# Patient Record
Sex: MALE | Race: WHITE | NOT HISPANIC OR LATINO | URBAN - METROPOLITAN AREA
[De-identification: names, ages, dates, MRNs, and addresses within clinical notes are randomized per-mention and may not be internally consistent; named-entity substitution may affect disease eponyms.]

---

## 2018-02-28 ENCOUNTER — OFFICE VISIT (OUTPATIENT)
Dept: DERMATOLOGY | Facility: IMAGING CENTER | Age: 23
End: 2018-02-28
Payer: COMMERCIAL

## 2018-02-28 VITALS — WEIGHT: 190 LBS | TEMPERATURE: 98.6 F | HEIGHT: 70 IN | BODY MASS INDEX: 27.2 KG/M2

## 2018-02-28 DIAGNOSIS — L70.0 ACNE VULGARIS: ICD-10-CM

## 2018-02-28 PROCEDURE — 99203 OFFICE O/P NEW LOW 30 MIN: CPT | Performed by: DERMATOLOGY

## 2018-02-28 ASSESSMENT — ENCOUNTER SYMPTOMS
CHILLS: 0
FEVER: 0

## 2018-02-28 NOTE — PROGRESS NOTES
Dermatology New Patient Visit    Chief Complaint   Patient presents with   • Acne       Subjective:     HPI:   Ricardo Bess is a 22 y.o. male presenting for    Acne  Active on back >> chest, face  Gets inflamed, painful lesions on the back weekly  Has been a problem since about age 14  Currently only treating with a salicylic acid 2% wash/scrub, helps minimally  Additional prior treatments: benzoyl peroxide 10% wash, doxycycline, minocycline - one was stopped 2/2 bad stomach aches, the other did not work  Has family h/o bad acne - sister and father have both been on isotretinoin in the past  Works out often, but denies any steroids  Takes OTC vitamins, no other medications  Patient is very embarassed by the appearance of his back, does not feel comfortable without a shirt on    History of skin cancer: No  History of biopsies:No  History of blistering/severe sunburns:No  Family history of skin cancer:No  Family history of atypical moles:No      No past medical history on file.    Current Outpatient Prescriptions on File Prior to Visit   Medication Sig Dispense Refill   • fluticasone (FLONASE) 50 MCG/ACT nasal spray Spray 1 Spray in nose every day. Each Nostril 1 Bottle 3   • methylPREDNISolone (MEDROL DOSPACK) 4 MG TABS Take  by mouth. U. D. 1 Each 0   • mupirocin (BACTROBAN) 2 % OINT Apply  to affected area(s) 2 times a day. To infected area 15 g 0     No current facility-administered medications on file prior to visit.        Allergies   Allergen Reactions   • Sulfa Drugs        No family history on file.    Social History     Social History   • Marital status: Single     Spouse name: N/A   • Number of children: N/A   • Years of education: N/A     Occupational History   • Not on file.     Social History Main Topics   • Smoking status: Never Smoker   • Smokeless tobacco: Not on file   • Alcohol use No   • Drug use: No   • Sexual activity: Not on file     Other Topics Concern   • Not on file     Social History  "Narrative   • No narrative on file       Review of Systems   Constitutional: Negative for chills and fever.   Skin: Negative for itching and rash.   All other systems reviewed and are negative.       Objective:     A focused cutaneous exam was completed including: scalp, hair, ears, face, eyelids, conjunctiva, lips, neck, chest, abdomen, back, left and right upper extremities (including hands/digits and fingernails) with the following pertinent findings listed below. Remaining above-listed examined areas within normal limits / negative for rashes or lesions.    Temperature 37 °C (98.6 °F), height 1.778 m (5' 10\"), weight 86.2 kg (190 lb).    Physical Exam   Constitutional: He is oriented to person, place, and time and well-developed, well-nourished, and in no distress.   HENT:   Head: Normocephalic and atraumatic.       Right Ear: External ear normal.   Left Ear: External ear normal.   Nose: Nose normal.   Eyes: Conjunctivae are normal.   Neck: Normal range of motion.   Pulmonary/Chest: Effort normal.   Neurological: He is alert and oriented to person, place, and time.   Skin: Skin is warm and dry.        Psychiatric: Mood and affect normal.   Vitals reviewed.      DATA: none applicable to review    Assessment and Plan:     1. Acne vulgaris, inflammatory , moderate   - educated patient about diagnosis, management options, and expectations of treatment  - discussed risks/benefits of retrial with antibiotics vs isotretinoin, and patient is interested in isotretinoin treatment  given lack of response/clearance with several courses of oral antibiotics in the past  - Introductory packet provided for the patient to review. I extensively discussed the potential adverse effects of treatment, including, but not limited to, teratogenicity, ocular disturbances, bony abnormailities, lipid disturbances, elevated LFTs/liver inflammation, reported associations with IBD, depression, endocrine disturbances, hematologic " abnormalities, and myopathy. More common side effects of dry skin/eyes/mucosa, photosensitivity, sticky skin, hair loss, fragile nails, paronychia, myalgias, fatigue, headache, and abdominal pain/nausea/diarrhea were also reviewed.  Patient is aware he cannot share the medication, and cannot donate blood during treatment, and through one month after completion of treatment.  - we discussed the need for baseline blood work, and follow-up blood work once treatment is initiated  - continue OTC daily salicylic acid wash for now  - CMP, CBC with diff, lipid panel     Followup: Return in about 2 weeks (around 3/14/2018) for f/u acne .    Bernadine Chauhan M.D.

## 2018-03-09 ENCOUNTER — HOSPITAL ENCOUNTER (OUTPATIENT)
Dept: LAB | Facility: MEDICAL CENTER | Age: 23
End: 2018-03-09
Attending: DERMATOLOGY
Payer: COMMERCIAL

## 2018-03-09 DIAGNOSIS — L70.0 ACNE VULGARIS: ICD-10-CM

## 2018-03-09 LAB
ALBUMIN SERPL BCP-MCNC: 4.8 G/DL (ref 3.2–4.9)
ALBUMIN/GLOB SERPL: 1.8 G/DL
ALP SERPL-CCNC: 42 U/L (ref 30–99)
ALT SERPL-CCNC: 21 U/L (ref 2–50)
ANION GAP SERPL CALC-SCNC: 4 MMOL/L (ref 0–11.9)
AST SERPL-CCNC: 21 U/L (ref 12–45)
BASOPHILS # BLD AUTO: 0.8 % (ref 0–1.8)
BASOPHILS # BLD: 0.04 K/UL (ref 0–0.12)
BILIRUB SERPL-MCNC: 0.7 MG/DL (ref 0.1–1.5)
BUN SERPL-MCNC: 12 MG/DL (ref 8–22)
CALCIUM SERPL-MCNC: 9.8 MG/DL (ref 8.5–10.5)
CHLORIDE SERPL-SCNC: 106 MMOL/L (ref 96–112)
CHOLEST SERPL-MCNC: 161 MG/DL (ref 100–199)
CO2 SERPL-SCNC: 27 MMOL/L (ref 20–33)
CREAT SERPL-MCNC: 0.91 MG/DL (ref 0.5–1.4)
EOSINOPHIL # BLD AUTO: 0.1 K/UL (ref 0–0.51)
EOSINOPHIL NFR BLD: 1.9 % (ref 0–6.9)
ERYTHROCYTE [DISTWIDTH] IN BLOOD BY AUTOMATED COUNT: 46.8 FL (ref 35.9–50)
GLOBULIN SER CALC-MCNC: 2.6 G/DL (ref 1.9–3.5)
GLUCOSE SERPL-MCNC: 86 MG/DL (ref 65–99)
HCT VFR BLD AUTO: 49.1 % (ref 42–52)
HDLC SERPL-MCNC: 36 MG/DL
HGB BLD-MCNC: 16.9 G/DL (ref 14–18)
IMM GRANULOCYTES # BLD AUTO: 0.04 K/UL (ref 0–0.11)
IMM GRANULOCYTES NFR BLD AUTO: 0.8 % (ref 0–0.9)
LDLC SERPL CALC-MCNC: 107 MG/DL
LYMPHOCYTES # BLD AUTO: 1.76 K/UL (ref 1–4.8)
LYMPHOCYTES NFR BLD: 34 % (ref 22–41)
MCH RBC QN AUTO: 30.6 PG (ref 27–33)
MCHC RBC AUTO-ENTMCNC: 34.4 G/DL (ref 33.7–35.3)
MCV RBC AUTO: 88.9 FL (ref 81.4–97.8)
MONOCYTES # BLD AUTO: 0.64 K/UL (ref 0–0.85)
MONOCYTES NFR BLD AUTO: 12.4 % (ref 0–13.4)
NEUTROPHILS # BLD AUTO: 2.59 K/UL (ref 1.82–7.42)
NEUTROPHILS NFR BLD: 50.1 % (ref 44–72)
NRBC # BLD AUTO: 0 K/UL
NRBC BLD-RTO: 0 /100 WBC
PLATELET # BLD AUTO: 251 K/UL (ref 164–446)
PMV BLD AUTO: 10.8 FL (ref 9–12.9)
POTASSIUM SERPL-SCNC: 4.4 MMOL/L (ref 3.6–5.5)
PROT SERPL-MCNC: 7.4 G/DL (ref 6–8.2)
RBC # BLD AUTO: 5.52 M/UL (ref 4.7–6.1)
SODIUM SERPL-SCNC: 137 MMOL/L (ref 135–145)
TRIGL SERPL-MCNC: 92 MG/DL (ref 0–149)
WBC # BLD AUTO: 5.2 K/UL (ref 4.8–10.8)

## 2018-03-09 PROCEDURE — 80053 COMPREHEN METABOLIC PANEL: CPT

## 2018-03-09 PROCEDURE — 36415 COLL VENOUS BLD VENIPUNCTURE: CPT

## 2018-03-09 PROCEDURE — 85025 COMPLETE CBC W/AUTO DIFF WBC: CPT

## 2018-03-09 PROCEDURE — 80061 LIPID PANEL: CPT

## 2018-03-14 ENCOUNTER — OFFICE VISIT (OUTPATIENT)
Dept: DERMATOLOGY | Facility: IMAGING CENTER | Age: 23
End: 2018-03-14
Payer: COMMERCIAL

## 2018-03-14 DIAGNOSIS — Z51.81 MEDICATION MONITORING ENCOUNTER: ICD-10-CM

## 2018-03-14 DIAGNOSIS — L70.0 ACNE VULGARIS: ICD-10-CM

## 2018-03-14 PROCEDURE — 99214 OFFICE O/P EST MOD 30 MIN: CPT | Performed by: DERMATOLOGY

## 2018-03-14 RX ORDER — ISOTRETINOIN 40 MG/1
40 CAPSULE ORAL DAILY
Qty: 30 CAP | Refills: 0 | Status: SHIPPED | OUTPATIENT
Start: 2018-03-14 | End: 2023-11-15

## 2018-03-14 ASSESSMENT — ENCOUNTER SYMPTOMS
FEVER: 0
CHILLS: 0

## 2018-03-14 NOTE — PROGRESS NOTES
Dermatology Return Patient Visit    Chief Complaint   Patient presents with   • Acne       Subjective:     HPI:   Ricardo Bess is a 22 y.o. male presenting for    Follow-up acne  Active on back >> chest, face  Flaring today on the back  These inflamed, painful lesions on the back occur ~weekly  Has been a problem since about age 14  Currently only treating with a salicylic acid 2% wash/scrub, helps minimally  Additional prior treatments: benzoyl peroxide 10% wash, doxycycline, minocycline - one was stopped 2/2 bad stomach aches, the other did not work  Has family h/o bad acne - sister and father have both been on isotretinoin in the past  Would like to start isotretinoin today -- has reviewed packet  Works out often, but denies any steroids  Takes OTC vitamins, no other medications  Patient is very embarassed by the appearance of his back, does not feel comfortable without a shirt on      No past medical history on file.    Current Outpatient Prescriptions on File Prior to Visit   Medication Sig Dispense Refill   • fluticasone (FLONASE) 50 MCG/ACT nasal spray Spray 1 Spray in nose every day. Each Nostril 1 Bottle 3   • methylPREDNISolone (MEDROL DOSPACK) 4 MG TABS Take  by mouth. U. D. 1 Each 0   • mupirocin (BACTROBAN) 2 % OINT Apply  to affected area(s) 2 times a day. To infected area 15 g 0     No current facility-administered medications on file prior to visit.        Allergies   Allergen Reactions   • Sulfa Drugs        No family history on file.    Social History     Social History   • Marital status: Single     Spouse name: N/A   • Number of children: N/A   • Years of education: N/A     Occupational History   • Not on file.     Social History Main Topics   • Smoking status: Never Smoker   • Smokeless tobacco: Not on file   • Alcohol use No   • Drug use: No   • Sexual activity: Not on file     Other Topics Concern   • Not on file     Social History Narrative   • No narrative on file       Review of Systems      Constitutional: Negative for chills and fever.   Skin: Negative for itching and rash.   All other systems reviewed and are negative.       Objective:     A focused cutaneous exam was completed including: scalp, hair, ears, face, eyelids, conjunctiva, lips, neck, chest, abdomen, back, left and right upper extremities (including hands/digits and fingernails) with the following pertinent findings listed below. Remaining above-listed examined areas within normal limits / negative for rashes or lesions.    There were no vitals taken for this visit.    Physical Exam   Constitutional: He is oriented to person, place, and time and well-developed, well-nourished, and in no distress.   HENT:   Head: Normocephalic and atraumatic.       Right Ear: External ear normal.   Left Ear: External ear normal.   Nose: Nose normal.   Eyes: Conjunctivae are normal.   Neck: Normal range of motion.   Pulmonary/Chest: Effort normal.   Neurological: He is alert and oriented to person, place, and time.   Skin: Skin is warm and dry.        Psychiatric: Mood and affect normal.       DATA: none applicable to review    Assessment and Plan:     1. Acne vulgaris, inflammatory , moderate   - educated patient about diagnosis, management options, and expectations of treatment  - given lack of response/clearance with several courses of oral antibiotics and prior treatments, he would like to pursue alternative treatment.  - Introductory packet for isotretinoin reviewed. I extensively discussed the potential adverse effects of treatment, including, but not limited to, teratogenicity, ocular disturbances, bony abnormailities, lipid disturbances, elevated LFTs/liver inflammation, reported associations with IBD, depression, endocrine disturbances, hematologic abnormalities, and myopathy. More common side effects of dry skin/eyes/mucosa, photosensitivity, sticky skin, hair loss, fragile nails, paronychia, myalgias, fatigue, headache, and abdominal  pain/nausea/diarrhea were also reviewed.  - recommended wearing glasses during treatment period  - Patient is aware he cannot share the medication, and he cannot donate blood during treatment, and through one month after completion of treatment.  - we discussed the need for follow-up blood work once treatment is initiated  - start isotretinoin 40mg daily  ipledge ID: [id number]  Goal dose: 120-150mg/kg; 86.2 kg = [id number]; total cumulative dose on day of evaluation: 0mg    2. Medication monitoring encounter  - LIPID PROFILE; Future  - COMP METABOLIC PANEL; Future  - CBC WITH DIFFERENTIAL; Future    Followup: Return in about 4 weeks (around 4/11/2018) for f/u accutane.    Bernadine Chauhan M.D.

## 2018-04-17 ENCOUNTER — OFFICE VISIT (OUTPATIENT)
Dept: DERMATOLOGY | Facility: IMAGING CENTER | Age: 23
End: 2018-04-17
Payer: COMMERCIAL

## 2018-04-17 DIAGNOSIS — L70.0 ACNE VULGARIS: ICD-10-CM

## 2018-04-17 PROCEDURE — 99212 OFFICE O/P EST SF 10 MIN: CPT | Performed by: DERMATOLOGY

## 2018-04-17 ASSESSMENT — ENCOUNTER SYMPTOMS
FEVER: 0
CHILLS: 0

## 2018-04-17 NOTE — PROGRESS NOTES
Dermatology Return Patient Visit    Chief Complaint   Patient presents with   • Acne       Subjective:     HPI:   Ricardo Bess is a 22 y.o. male presenting for    Follow-up acne  Active on back >> chest, face  Was unable to get isotretinoin after last visit 2/2 insurance issues -- also recently found out from his boss that he will be working outside all day and is concerned about starting the pills  Today, not too much activity  Has been washing with exfoliating cloth, wiping with alcohol wipes    History:  Painful lesions on the back occur ~weekly  Has been a problem since about age 14  Currently only treating with a salicylic acid 2% wash/scrub, helps minimally  Additional prior treatments: benzoyl peroxide 10% wash, doxycycline, minocycline - one was stopped 2/2 bad stomach aches, thinks the other did not work; mostly concerned about antibiotics 2/2 h/o MRSA infections, does not want to become resistant to treatment  Has family h/o bad acne - sister and father have both been on isotretinoin in the past  Reviewed isotretinoin packet at last visit  Works out often, but denies any steroids  Takes OTC vitamins, no other medications  Patient is very embarassed by the appearance of his back, does not feel comfortable without a shirt on      No past medical history on file.    Current Outpatient Prescriptions on File Prior to Visit   Medication Sig Dispense Refill   • isotretinoin (ACCUTANE) 40 MG capsule Take 1 Cap by mouth every day. ipledge Id: [id number] 30 Cap 0   • fluticasone (FLONASE) 50 MCG/ACT nasal spray Spray 1 Spray in nose every day. Each Nostril 1 Bottle 3   • methylPREDNISolone (MEDROL DOSPACK) 4 MG TABS Take  by mouth. U. D. 1 Each 0   • mupirocin (BACTROBAN) 2 % OINT Apply  to affected area(s) 2 times a day. To infected area 15 g 0     No current facility-administered medications on file prior to visit.        Allergies   Allergen Reactions   • Sulfa Drugs        No family history on file.    Social  History     Social History   • Marital status: Single     Spouse name: N/A   • Number of children: N/A   • Years of education: N/A     Occupational History   • Not on file.     Social History Main Topics   • Smoking status: Never Smoker   • Smokeless tobacco: Not on file   • Alcohol use No   • Drug use: No   • Sexual activity: Not on file     Other Topics Concern   • Not on file     Social History Narrative   • No narrative on file       Review of Systems   Constitutional: Negative for chills and fever.   Skin: Negative for itching and rash.   All other systems reviewed and are negative.       Objective:     A focused cutaneous exam was completed including: scalp, hair, ears, face, eyelids, conjunctiva, lips, neck, chest, abdomen, back, left and right upper extremities (including hands/digits and fingernails) with the following pertinent findings listed below. Remaining above-listed examined areas within normal limits / negative for rashes or lesions.    There were no vitals taken for this visit.    Physical Exam   Constitutional: He is oriented to person, place, and time and well-developed, well-nourished, and in no distress.   HENT:   Head: Normocephalic and atraumatic.       Right Ear: External ear normal.   Left Ear: External ear normal.   Nose: Nose normal.   Eyes: Conjunctivae are normal.   Neck: Normal range of motion.   Pulmonary/Chest: Effort normal.   Neurological: He is alert and oriented to person, place, and time.   Skin: Skin is warm and dry.        Psychiatric: Mood and affect normal.       DATA: none applicable to review    Assessment and Plan:     1. Acne vulgaris, inflammatory , moderate   - discussed continued management options, and expectations of treatment  - given lack of response/clearance with several courses of oral antibiotics and prior treatments, he would like to pursue alternative treatment, ultimately isotretinoin, but given will be working outside for the next few months (over  summer), I advised it would be best to hold off on initiating this treatment today  - recommended OTC panoxyl or clean & clear benzoyl peroxide 10% wash to use on the body in the shower. To leave on 5-10 mins prior to rinsing (he has not used this way before). S/e bleaching of clothes/towels discussed  - recommended obagi 2% salicylic acid wipes to use daily to the aa on the body   - Instructed to start Differin 0.1% gel qhs. To use pea-sized amount on entire face; start 2-3 nights/week and titrate up as tolerated. S/e irritation discussed  - we discussed low-dose minocycline (solodyn 55mg) if he wishes to start systemic therapy. He will think about this and let me know. s/e including, but not limited to, dizziness, fatigue, arthralgias, autoimmune hepatitis, DRESS, drug induced SCLE, hypersensitivity reaction, discussed  - plan to re-discuss isotretinoin in the fall if needed    Followup: Return for f/u acne, fall 2018.    Bernadine Chauhan M.D.

## 2018-11-20 ENCOUNTER — APPOINTMENT (OUTPATIENT)
Dept: RADIOLOGY | Facility: IMAGING CENTER | Age: 23
End: 2018-11-20
Attending: PHYSICIAN ASSISTANT
Payer: COMMERCIAL

## 2018-11-20 ENCOUNTER — OFFICE VISIT (OUTPATIENT)
Dept: URGENT CARE | Facility: CLINIC | Age: 23
End: 2018-11-20
Payer: COMMERCIAL

## 2018-11-20 VITALS
DIASTOLIC BLOOD PRESSURE: 60 MMHG | WEIGHT: 199.2 LBS | BODY MASS INDEX: 28.52 KG/M2 | RESPIRATION RATE: 14 BRPM | HEART RATE: 98 BPM | TEMPERATURE: 98.8 F | SYSTOLIC BLOOD PRESSURE: 130 MMHG | HEIGHT: 70 IN | OXYGEN SATURATION: 96 %

## 2018-11-20 DIAGNOSIS — M25.571 ACUTE RIGHT ANKLE PAIN: ICD-10-CM

## 2018-11-20 DIAGNOSIS — M79.604 RIGHT LEG PAIN: ICD-10-CM

## 2018-11-20 PROCEDURE — 99203 OFFICE O/P NEW LOW 30 MIN: CPT | Performed by: PHYSICIAN ASSISTANT

## 2018-11-20 PROCEDURE — 73610 X-RAY EXAM OF ANKLE: CPT | Mod: 26,RT | Performed by: PHYSICIAN ASSISTANT

## 2018-11-20 RX ORDER — TRAMADOL HYDROCHLORIDE 50 MG/1
50 TABLET ORAL EVERY 6 HOURS PRN
Qty: 12 TAB | Refills: 0 | Status: SHIPPED | OUTPATIENT
Start: 2018-11-20 | End: 2018-11-23

## 2018-11-20 ASSESSMENT — ENCOUNTER SYMPTOMS
TINGLING: 0
FEVER: 0
FALLS: 0
SENSORY CHANGE: 0
FOCAL WEAKNESS: 0

## 2018-11-20 NOTE — PROGRESS NOTES
"Subjective:   Ricardo Bess is a 23 y.o. male who presents for Ankle Pain (RT ankle pain no injury x1 week)    This is a new problem.  Patient presents to urgent care with pain in the right distal fibula present for the past 1 week which is progressively worsening.  He has been taking ibuprofen with no improvement.  He denies any swelling or injury.  Of note, however, the patient has been aggressively physically training and prior to the onset of pain had been doing a fair amount of running and stair climbing.  He denies any prior issues with the right ankle.  The pain today is rated at severe and is affecting his ability to be mobile.  Pain is increased with standing and walking and better with rest.     Ankle Pain    Pertinent negatives include no tingling.     Review of Systems   Constitutional: Negative for fever.   Musculoskeletal: Positive for joint pain. Negative for falls.   Neurological: Negative for tingling, sensory change and focal weakness.   All other systems reviewed and are negative.    Allergies   Allergen Reactions   • Sulfa Drugs         Objective:   /60 (BP Location: Left arm, Patient Position: Sitting, BP Cuff Size: Adult long)   Pulse 98   Temp 37.1 °C (98.8 °F)   Resp 14   Ht 1.778 m (5' 10\")   Wt 90.4 kg (199 lb 3.2 oz)   SpO2 96%   BMI 28.58 kg/m²   Physical Exam   Constitutional: He is oriented to person, place, and time. He appears well-developed and well-nourished.   HENT:   Head: Normocephalic and atraumatic.   Right Ear: Tympanic membrane, external ear and ear canal normal.   Left Ear: Tympanic membrane, external ear and ear canal normal.   Nose: Nose normal.   Mouth/Throat: Uvula is midline, oropharynx is clear and moist and mucous membranes are normal. No oropharyngeal exudate.   Eyes: Pupils are equal, round, and reactive to light. Conjunctivae and EOM are normal.   Neck: Normal range of motion. Neck supple.   Cardiovascular: Normal rate, regular rhythm and normal heart " sounds.  Exam reveals no friction rub.    No murmur heard.  Pulmonary/Chest: Effort normal and breath sounds normal. No respiratory distress.   Abdominal: There is no hepatosplenomegaly.   Musculoskeletal: Normal range of motion.        Right ankle: He exhibits deformity and abnormal pulse. He exhibits normal range of motion, no swelling and no ecchymosis. Tenderness. Lateral malleolus tenderness found. No medial malleolus, no AITFL, no CF ligament, no posterior TFL, no head of 5th metatarsal and no proximal fibula tenderness found. Achilles tendon normal.        Feet:    Tender to palpation directly along the distal 1/3 of the fibula   Lymphadenopathy:        Head (right side): No submental, no submandibular and no tonsillar adenopathy present.        Head (left side): No submental, no submandibular and no tonsillar adenopathy present.     He has no cervical adenopathy.        Right: No supraclavicular adenopathy present.        Left: No supraclavicular adenopathy present.   Neurological: He is alert and oriented to person, place, and time.   Skin: Skin is warm and dry. No rash noted.   Psychiatric: He has a normal mood and affect. Judgment normal.          Assessment/Plan:   Assessment    1. Acute right ankle pain  - DX-ANKLE 3+ VIEWS RIGHT; Future  - REFERRAL TO SPORTS MEDICINE  - tramadol (ULTRAM) 50 MG Tab; Take 1 Tab by mouth every 6 hours as needed for up to 3 days.  Dispense: 12 Tab; Refill: 0  - Consent for Opiate Prescription    X-ray is without fracture.  However, the patient's presentation is concerning for stress fracture.  A referral was placed to sports medicine.  The patient has been taking ibuprofen 800 mg every 6 hours without any relief of pain.  He is in obvious discomfort and limping significantly here in the clinic.  Therefore, he is given a small supply of tramadol to use sparingly for severe pain only.  Patient is counseled on avoidance of driving while taking this medication.  Note given off  work today.  Recommend avoidance of aggravating activity.  Ice and elevation.    In prescribing controlled substances to this patient, I certify that I have obtained and reviewed the medical history of Ricardo Bess. I have also made a good anmol effort to obtain applicable records from other providers who have treated the patient and records did not demonstrate any increased risk of substance abuse that would prevent me from prescribing controlled substances.     I have conducted a physical exam and documented it. I have reviewed Mr. Bess’s prescription history as maintained by the [place] Prescription Monitoring Program.     I have assessed the patient’s risk for abuse, dependency, and addiction using the validated Opioid Risk Tool available at https://www.mdcalc.com/wjlurx-mvrt-kims-ort-narcotic-abuse.     Given the above, I believe the benefits of controlled substance therapy outweigh the risks. The reasons for prescribing controlled substances include non-narcotic, oral analgesic alternatives have been inadequate for pain control. Accordingly, I have discussed the risk and benefits, treatment plan, and alternative therapies with the patient.           2. Right leg pain  - DX-ANKLE 3+ VIEWS RIGHT; Future  - REFERRAL TO SPORTS MEDICINE  - tramadol (ULTRAM) 50 MG Tab; Take 1 Tab by mouth every 6 hours as needed for up to 3 days.  Dispense: 12 Tab; Refill: 0  - Consent for Opiate Prescription    Differential diagnosis, natural history, supportive care, and indications for immediate follow-up discussed.        Strict ER precautions given.    Please note that this note was created using voice recognition speech to text software. Every effort has been made to correct obvious errors.  However, I expect there are errors of grammar and possibly context that were not discovered prior to finalizing the note

## 2018-11-20 NOTE — LETTER
November 20, 2018         Patient: Ricardo Bess   YOB: 1995   Date of Visit: 11/20/2018           To Whom it May Concern:    Ricardo Bess was seen in my clinic on 11/20/2018. He may return to work on 11/21/18..    If you have any questions or concerns, please don't hesitate to call.        Sincerely,           Lita Francis P.A.-C.  Electronically Signed

## 2018-11-20 NOTE — PATIENT INSTRUCTIONS
Ankle Pain  Many things can cause ankle pain, including an injury to the area and overuse of the ankle. The ankle joint holds your body weight and allows you to move around. Ankle pain can occur on either side or the back of one ankle or both ankles. Ankle pain may be sharp and burning or dull and aching. There may be tenderness, stiffness, redness, or warmth around the ankle.  Follow these instructions at home:  Activity  · Rest your ankle as told by your health care provider. Avoid any activities that cause ankle pain.  · Do exercises as told by your health care provider.  · Ask your health care provider if you can drive.  Using a brace, a bandage, or crutches  · If you were given a brace:  ¨ Wear it as told by your health care provider.  ¨ Remove it when you take a bath or a shower.  ¨ Try not to move your ankle very much, but wiggle your toes from time to time. This helps to prevent swelling.  · If you were given an elastic bandage:  ¨ Remove it when you take a bath or a shower.  ¨ Try not to move your ankle very much, but wiggle your toes from time to time. This helps to prevent swelling.  ¨ Adjust the bandage to make it more comfortable if it feels too tight.  ¨ Loosen the bandage if you have numbness or tingling in your foot or if your foot turns cold and blue.  · If you have crutches, use them as told by your health care provider. Continue to use them until you can walk without feeling pain in your ankle.  Managing pain, stiffness, and swelling  · Raise (elevate) your ankle above the level of your heart while you are sitting or lying down.  · If directed, apply ice to the area:  ¨ Put ice in a plastic bag.  ¨ Place a towel between your skin and the bag.  ¨ Leave the ice on for 20 minutes, 2-3 times per day.  General instructions  · Keep all follow-up visits as told by your health care provider. This is important.  · Record this information that may be helpful for you and your health care provider:  ¨ How  often you have ankle pain.  ¨ Where the pain is located.  ¨ What the pain feels like.  · Take over-the-counter and prescription medicines only as told by your health care provider.  Contact a health care provider if:  · Your pain gets worse.  · Your pain is not relieved with medicines.  · You have a fever or chills.  · You are having more trouble with walking.  · You have new symptoms.  Get help right away if:  · Your foot, leg, toes, or ankle tingles or becomes numb.  · Your foot, leg, toes, or ankle becomes swollen.  · Your foot, leg, toes, or ankle turns pale or blue.  This information is not intended to replace advice given to you by your health care provider. Make sure you discuss any questions you have with your health care provider.  Document Released: 06/07/2011 Document Revised: 08/18/2017 Document Reviewed: 07/19/2016  [employer] Interactive Patient Education © 2017 [employer]

## 2018-11-27 ENCOUNTER — OFFICE VISIT (OUTPATIENT)
Dept: MEDICAL GROUP | Facility: CLINIC | Age: 23
End: 2018-11-27
Payer: COMMERCIAL

## 2018-11-27 VITALS
BODY MASS INDEX: 28.52 KG/M2 | HEART RATE: 86 BPM | SYSTOLIC BLOOD PRESSURE: 126 MMHG | OXYGEN SATURATION: 98 % | DIASTOLIC BLOOD PRESSURE: 78 MMHG | RESPIRATION RATE: 16 BRPM | HEIGHT: 70 IN | TEMPERATURE: 99.3 F | WEIGHT: 199.19 LBS

## 2018-11-27 DIAGNOSIS — S93.491A HIGH ANKLE SPRAIN, RIGHT, INITIAL ENCOUNTER: ICD-10-CM

## 2018-11-27 PROCEDURE — 99203 OFFICE O/P NEW LOW 30 MIN: CPT | Performed by: FAMILY MEDICINE

## 2018-11-27 ASSESSMENT — ENCOUNTER SYMPTOMS
NAUSEA: 0
VOMITING: 0
DIZZINESS: 0
SHORTNESS OF BREATH: 0
FEVER: 0
HEADACHES: 0
CHILLS: 0

## 2018-11-27 NOTE — PROGRESS NOTES
Subjective:      Ricardo Bess is a 23 y.o. male who presents with Ankle Pain (Referral from UC/ R leg and ankle pain )     Referred by Lita Francis P.A.-C.  for evaluation of RIGHT ankle pain    HPI  RIGHT ankle pain  Insidious onset approximately Tuesday, November 13  He does not remember any specific injury, however he was working out the night before his symptoms began  His workup consisted of using treadmill, elliptical and lifting weight  Woke in the morning and noticed pain mostly at the lateral aspect of the RIGHT ankle  Associated with some swelling which has persisted  Now having some discomfort at the RIGHT lateral calf region which he attributes to his continual limping due to his ankle pain  Improved with rest  Difficult to get up after sitting for long periods of time and in the mornings  Movement tends to improve his symptoms somewhat  Taking ibuprofen for pain which is not really helping, tramadol did not help    Review of Systems   Constitutional: Negative for chills and fever.   Respiratory: Negative for shortness of breath.    Cardiovascular: Negative for chest pain.   Gastrointestinal: Negative for nausea and vomiting.   Neurological: Negative for dizziness and headaches.     PMH:  has no past medical history on file.  MEDS:   Current Outpatient Prescriptions:   •  isotretinoin (ACCUTANE) 40 MG capsule, Take 1 Cap by mouth every day. ipledge Id: [id number] (Patient not taking: Reported on 11/27/2018), Disp: 30 Cap, Rfl: 0  •  fluticasone (FLONASE) 50 MCG/ACT nasal spray, Spray 1 Spray in nose every day. Each Nostril (Patient not taking: Reported on 11/27/2018), Disp: 1 Bottle, Rfl: 3  •  methylPREDNISolone (MEDROL DOSPACK) 4 MG TABS, Take  by mouth. U. D. (Patient not taking: Reported on 11/27/2018), Disp: 1 Each, Rfl: 0  •  mupirocin (BACTROBAN) 2 % OINT, Apply  to affected area(s) 2 times a day. To infected area (Patient not taking: Reported on 11/27/2018), Disp: 15 g, Rfl: 0  ALLERGIES:    "  Allergies   Allergen Reactions   • Sulfa Drugs      SURGHX:   Past Surgical History:   Procedure Laterality Date   • HERNIA REPAIR       SOCHX:  reports that he has never smoked. He has never used smokeless tobacco. He reports that he does not drink alcohol or use drugs.  FH: Family history was reviewed, no pertinent findings to report     Objective:     /78 (BP Location: Left arm, Patient Position: Sitting, BP Cuff Size: Adult)   Pulse 86   Temp 37.4 °C (99.3 °F) (Temporal)   Resp 16   Ht 1.778 m (5' 10\")   Wt 90.4 kg (199 lb 3 oz)   SpO2 98%   BMI 28.58 kg/m²      Physical Exam     RIGHT ANKLE:  There is POSITIVE swelling noted at the lateral ankle  Range of motion DIMINISHED with dorsiflexion and plantarflexion, inversion and eversion to approximately 80% of normal motion  There is NO tenderness of the ATFL, CF or PTF ligament  There is NO tenderness of the lateral malleolus or medial malleolus  Anterior drawer testing is POSITIVE  Talar tilt testing is NEGATIVE  The foot and ankle is otherwise neurovascularly intact  POSITIVE pain with external rotation  POSITIVE tenderness at the tibiofibular joint distally    RIGHT FOOT:  There is NO swelling noted at the foot  There is NO tenderness at the base of the fifth metatarsal, cuboid, or tarsal navicular  There is NO pain with metatarsal squeeze test    LEFT ANKLE:  There is NO swelling noted at the ankle  Range of motion intact with dorsiflexion and plantarflexion, inversion and eversion  There is NO tenderness of the ATFL, CF or PTF ligament  There is NO tenderness of the lateral malleolus or medial malleolus  Anterior drawer testing is NEGATIVE  Talar tilt testing is NEGATIVE  The foot and ankle is otherwise neurovascularly intact    LEFT FOOT:  There is NO swelling noted at the foot  There is NO tenderness at the base of the fifth metatarsal, cuboid, or tarsal navicular  There is NO pain with metatarsal squeeze test    NEUTRAL stance  Able to " ambulate with ANTALGIC gait       Assessment/Plan:     1. High ankle sprain, right, initial encounter  REFERRAL TO PHYSICAL THERAPY Reason for Therapy: Eval/Treat/Report     RIGHT ankle pain with findings consistent on examination with high ankle sprain  HOWEVER, there is no history of direct trauma  He does use the treadmill and is very active, but he does not recall any specific injury    We discussed options including Cam walker boot trial versus ankle brace trial together with formal physical therapy, and patient has elected to proceed with trying functional ankle brace together with formal therapy    Return in about 4 weeks (around 12/25/2018).  To see how he is doing with formal therapy and with his ankle brace, meanwhile, if symptoms worsen or fail to improve, we will reevaluate                11/20/2018 12:20 PM    HISTORY/REASON FOR EXAM:  Atraumatic Pain/Swelling/Deformity      TECHNIQUE/EXAM DESCRIPTION AND NUMBER OF VIEWS:  3 views of the RIGHT ankle.    COMPARISON: None    FINDINGS:  There is no evidence of fracture or dislocation.  The ankle mortise is well-maintained. The talar dome is preserved.  No substantial soft tissue swelling is noted. Small pretibial calcification may represent a phlebolith or may be dystrophic.     Impression       No evidence of fracture or dislocation.     Interpreted in the office today with the patient    Thank you Lita Francis P.A.-C. for allowing me to participate in caring for your patient.

## 2018-12-05 ENCOUNTER — APPOINTMENT (OUTPATIENT)
Dept: PHYSICAL THERAPY | Facility: REHABILITATION | Age: 23
End: 2018-12-05
Attending: FAMILY MEDICINE
Payer: COMMERCIAL

## 2019-10-29 ENCOUNTER — OFFICE VISIT (OUTPATIENT)
Dept: URGENT CARE | Facility: CLINIC | Age: 24
End: 2019-10-29
Payer: COMMERCIAL

## 2019-10-29 VITALS
HEIGHT: 70 IN | BODY MASS INDEX: 28.6 KG/M2 | WEIGHT: 199.8 LBS | HEART RATE: 91 BPM | OXYGEN SATURATION: 99 % | TEMPERATURE: 98.6 F | SYSTOLIC BLOOD PRESSURE: 134 MMHG | RESPIRATION RATE: 14 BRPM | DIASTOLIC BLOOD PRESSURE: 60 MMHG

## 2019-10-29 DIAGNOSIS — J02.0 STREP THROAT: ICD-10-CM

## 2019-10-29 LAB
INT CON NEG: NORMAL
INT CON POS: NORMAL
S PYO AG THROAT QL: POSITIVE

## 2019-10-29 PROCEDURE — 87880 STREP A ASSAY W/OPTIC: CPT | Performed by: PHYSICIAN ASSISTANT

## 2019-10-29 PROCEDURE — 99214 OFFICE O/P EST MOD 30 MIN: CPT | Performed by: PHYSICIAN ASSISTANT

## 2019-10-29 RX ORDER — AMOXICILLIN 500 MG/1
500 CAPSULE ORAL 2 TIMES DAILY
Qty: 20 CAP | Refills: 0 | Status: SHIPPED | OUTPATIENT
Start: 2019-10-29 | End: 2019-11-08

## 2019-10-29 ASSESSMENT — ENCOUNTER SYMPTOMS
EYE REDNESS: 0
CHILLS: 0
STRIDOR: 0
EYE DISCHARGE: 0
SHORTNESS OF BREATH: 0
EYE PAIN: 0
PALPITATIONS: 0
HEADACHES: 0
SORE THROAT: 1
SPUTUM PRODUCTION: 0
FEVER: 0
WHEEZING: 0
HEMOPTYSIS: 0

## 2019-10-29 NOTE — LETTER
October 29, 2019         Patient: Ricardo Bess   YOB: 1995   Date of Visit: 10/29/2019           To Whom it May Concern:    Ricardo Bess was seen in my clinic on 10/29/2019. Please excuse him from work 10/30/2019.  If you have any questions or concerns, please don't hesitate to call.        Sincerely,           Jose Maria Rhodes P.A.-C.  Electronically Signed

## 2019-10-30 NOTE — PROGRESS NOTES
Subjective:      Ricardo Bess is a 24 y.o. male who presents with Pharyngitis (x4 days) and Nasal Congestion (x4 days)            Pharyngitis    This is a new problem. The current episode started in the past 7 days. The problem has been unchanged. The pain is moderate. Associated symptoms include congestion and ear pain. Pertinent negatives include no ear discharge, headaches, shortness of breath or stridor. He has tried nothing for the symptoms.       Review of Systems   Constitutional: Positive for malaise/fatigue. Negative for chills and fever.   HENT: Positive for congestion, ear pain and sore throat. Negative for ear discharge.    Eyes: Negative for pain, discharge and redness.   Respiratory: Negative for hemoptysis, sputum production, shortness of breath, wheezing and stridor.    Cardiovascular: Negative for chest pain and palpitations.   Skin: Negative for itching and rash.   Neurological: Negative for headaches.   All other systems reviewed and are negative.    PMH:  has no past medical history on file.  MEDS:   Current Outpatient Medications:   •  amoxicillin (AMOXIL) 500 MG Cap, Take 1 Cap by mouth 2 times a day for 10 days., Disp: 20 Cap, Rfl: 0  •  isotretinoin (ACCUTANE) 40 MG capsule, Take 1 Cap by mouth every day. ipledge Id: [id number] (Patient not taking: Reported on 11/27/2018), Disp: 30 Cap, Rfl: 0  •  fluticasone (FLONASE) 50 MCG/ACT nasal spray, Spray 1 Spray in nose every day. Each Nostril (Patient not taking: Reported on 11/27/2018), Disp: 1 Bottle, Rfl: 3  •  methylPREDNISolone (MEDROL DOSPACK) 4 MG TABS, Take  by mouth. U. D. (Patient not taking: Reported on 11/27/2018), Disp: 1 Each, Rfl: 0  •  mupirocin (BACTROBAN) 2 % OINT, Apply  to affected area(s) 2 times a day. To infected area (Patient not taking: Reported on 11/27/2018), Disp: 15 g, Rfl: 0  ALLERGIES:   Allergies   Allergen Reactions   • Sulfa Drugs      SURGHX:   Past Surgical History:   Procedure Laterality Date   • HERNIA  "REPAIR       SOCHX:  reports that he has never smoked. He has never used smokeless tobacco. He reports that he does not drink alcohol or use drugs.  FH: Family history was reviewed, no pertinent findings to report  Medications, Allergies, and current problem list reviewed today in Epic       Objective:     Blood Pressure 134/60 (BP Location: Left arm, Patient Position: Sitting, BP Cuff Size: Large adult)   Pulse 91   Temperature 37 °C (98.6 °F)   Respiration 14   Height 1.778 m (5' 10\")   Weight 90.6 kg (199 lb 12.8 oz)   Oxygen Saturation 99%   Body Mass Index 28.67 kg/m²      Physical Exam   Constitutional: He is oriented to person, place, and time. He appears well-developed and well-nourished.  Non-toxic appearance. He does not have a sickly appearance. He does not appear ill. No distress.   HENT:   Head: Normocephalic and atraumatic.   Right Ear: Hearing, tympanic membrane, external ear and ear canal normal.   Left Ear: Hearing, tympanic membrane, external ear and ear canal normal.   Nose: Nose normal.   Mouth/Throat: Uvula is midline and mucous membranes are normal. Oropharyngeal exudate and posterior oropharyngeal erythema present. No posterior oropharyngeal edema or tonsillar abscesses.   Neck: Normal range of motion. Neck supple. No JVD present. No tracheal deviation present. No thyromegaly present.   Cardiovascular: Regular rhythm and normal heart sounds. Exam reveals no gallop and no friction rub.   No murmur heard.  Pulmonary/Chest: Effort normal and breath sounds normal. No stridor. No respiratory distress. He has no wheezes. He has no rales. He exhibits no tenderness.   Lymphadenopathy:     He has cervical adenopathy.   Neurological: He is alert and oriented to person, place, and time.   Skin: Skin is warm and dry.   Psychiatric: He has a normal mood and affect. His behavior is normal. Judgment and thought content normal.   Vitals reviewed.            Rapid Strep: POS    Assessment/Plan:     1. " Strep throat    - POCT Rapid Strep A  - amoxicillin (AMOXIL) 500 MG Cap; Take 1 Cap by mouth 2 times a day for 10 days.  Dispense: 20 Cap; Refill: 0    Differential diagnosis, natural history, supportive care discussed. Follow-up with primary care provider within 7-10 days, emergency room precautions discussed.  Patient and/or family appears understanding of information.  Handout and review of patients diagnosis and treatment was discussed extensively.

## 2019-12-07 ENCOUNTER — OFFICE VISIT (OUTPATIENT)
Dept: URGENT CARE | Facility: CLINIC | Age: 24
End: 2019-12-07
Payer: COMMERCIAL

## 2019-12-07 VITALS
BODY MASS INDEX: 28.63 KG/M2 | HEIGHT: 70 IN | OXYGEN SATURATION: 99 % | HEART RATE: 74 BPM | SYSTOLIC BLOOD PRESSURE: 130 MMHG | DIASTOLIC BLOOD PRESSURE: 80 MMHG | RESPIRATION RATE: 14 BRPM | TEMPERATURE: 98.6 F | WEIGHT: 200 LBS

## 2019-12-07 DIAGNOSIS — R09.82 PND (POST-NASAL DRIP): ICD-10-CM

## 2019-12-07 DIAGNOSIS — J02.9 PHARYNGITIS, UNSPECIFIED ETIOLOGY: ICD-10-CM

## 2019-12-07 DIAGNOSIS — R05.9 COUGH: ICD-10-CM

## 2019-12-07 LAB
INT CON NEG: NORMAL
INT CON POS: NORMAL
S PYO AG THROAT QL: NEGATIVE

## 2019-12-07 PROCEDURE — 99213 OFFICE O/P EST LOW 20 MIN: CPT | Performed by: PHYSICIAN ASSISTANT

## 2019-12-07 PROCEDURE — 87880 STREP A ASSAY W/OPTIC: CPT | Performed by: PHYSICIAN ASSISTANT

## 2019-12-07 ASSESSMENT — ENCOUNTER SYMPTOMS
WHEEZING: 0
DIARRHEA: 0
CHILLS: 0
NAUSEA: 0
COUGH: 1
FEVER: 0
SORE THROAT: 1
SHORTNESS OF BREATH: 0
ABDOMINAL PAIN: 0
VOMITING: 0
SPUTUM PRODUCTION: 0

## 2019-12-07 NOTE — PROGRESS NOTES
"Subjective:   Ricardo Bess  is a 24 y.o. male who presents for Pharyngitis (sore throat x 3 days)        Patient with no plan last 3 days of sinus congestion persistent dry coughing and sore throat.  Notes sore throat worse in the morning improves through the day.  Denies fevers or chills.  Notes some seasonal allergies has been taking Flonase intermittently as well as antihistamine fairly consistently.  Ear pain.  Denies nausea vomiting heidi pain diarrhea or rash.  Notes last 6 months with strep pharyngitis.  Still with some waxing and waning feelings of fatigue and congestion since then.  Denies feeling sick recently.  Notes works in the [employer].    Pharyngitis    This is a new problem. The current episode started in the past 7 days (3d). Associated symptoms include congestion and coughing. Pertinent negatives include no abdominal pain, diarrhea, ear pain, shortness of breath or vomiting.     Review of Systems   Constitutional: Negative for chills and fever.   HENT: Positive for congestion and sore throat. Negative for ear pain.    Respiratory: Positive for cough. Negative for sputum production, shortness of breath and wheezing.    Gastrointestinal: Negative for abdominal pain, diarrhea, nausea and vomiting.   Skin: Negative for rash.   Endo/Heme/Allergies: Positive for environmental allergies.     Allergies   Allergen Reactions   • Sulfa Drugs       Objective:   /80 (BP Location: Left arm, Patient Position: Sitting, BP Cuff Size: Adult)   Pulse 74   Temp 37 °C (98.6 °F) (Temporal)   Resp 14   Ht 1.778 m (5' 10\")   Wt 90.7 kg (200 lb)   SpO2 99%   BMI 28.70 kg/m²   Physical Exam  Vitals signs and nursing note reviewed.   Constitutional:       General: He is not in acute distress.     Appearance: He is well-developed. He is not diaphoretic.   HENT:      Head: Normocephalic and atraumatic.      Right Ear: Tympanic membrane, ear canal and external ear normal.      Left Ear: Tympanic membrane, " ear canal and external ear normal.      Nose: Nose normal.      Mouth/Throat:      Pharynx: Uvula midline. Posterior oropharyngeal erythema ( mild PND) present. No oropharyngeal exudate.      Tonsils: No tonsillar abscesses.   Eyes:      General: No scleral icterus.        Right eye: No discharge.         Left eye: No discharge.      Conjunctiva/sclera: Conjunctivae normal.   Neck:      Musculoskeletal: Neck supple.   Pulmonary:      Effort: Pulmonary effort is normal. No respiratory distress.      Breath sounds: No decreased breath sounds, wheezing, rhonchi or rales.   Musculoskeletal: Normal range of motion.   Lymphadenopathy:      Cervical: Cervical adenopathy ( mild bilat) present.   Skin:     General: Skin is warm and dry.      Coloration: Skin is not pale.   Neurological:      Mental Status: He is alert and oriented to person, place, and time.      Coordination: Coordination normal.     POCT strep - NEG      Assessment/Plan:   1. Pharyngitis, unspecified etiology  - POCT Rapid Strep A    2. PND (post-nasal drip)    3. Cough  Supportive care is reviewed with patient/caregiver - recommend to push PO fluids and electrolytes, .Nsaids/tylenol, netti pot/saline irrig, humidifier in home, flonase, ponaris, antihistamine, we review treatment options including viscous lidocaine patient declines throat analgesics-we will opt to use over-the-counter -offered referrals to ENT or primary care with some persistence of feeling rundown from strep last month but patient declines will opt to follow-up on his own  Return to clinic with lack of resolution or progression of symptoms.    Differential diagnosis, natural history, supportive care, and indications for immediate follow-up discussed.

## 2023-10-04 SDOH — HEALTH STABILITY: PHYSICAL HEALTH: ON AVERAGE, HOW MANY DAYS PER WEEK DO YOU ENGAGE IN MODERATE TO STRENUOUS EXERCISE (LIKE A BRISK WALK)?: 7 DAYS

## 2023-10-04 SDOH — HEALTH STABILITY: MENTAL HEALTH
STRESS IS WHEN SOMEONE FEELS TENSE, NERVOUS, ANXIOUS, OR CAN'T SLEEP AT NIGHT BECAUSE THEIR MIND IS TROUBLED. HOW STRESSED ARE YOU?: VERY MUCH

## 2023-10-04 SDOH — ECONOMIC STABILITY: TRANSPORTATION INSECURITY
IN THE PAST 12 MONTHS, HAS LACK OF RELIABLE TRANSPORTATION KEPT YOU FROM MEDICAL APPOINTMENTS, MEETINGS, WORK OR FROM GETTING THINGS NEEDED FOR DAILY LIVING?: NO

## 2023-10-04 SDOH — ECONOMIC STABILITY: HOUSING INSECURITY
IN THE LAST 12 MONTHS, WAS THERE A TIME WHEN YOU DID NOT HAVE A STEADY PLACE TO SLEEP OR SLEPT IN A SHELTER (INCLUDING NOW)?: NO

## 2023-10-04 SDOH — ECONOMIC STABILITY: HOUSING INSECURITY: IN THE LAST 12 MONTHS, HOW MANY PLACES HAVE YOU LIVED?: 1

## 2023-10-04 SDOH — ECONOMIC STABILITY: INCOME INSECURITY: HOW HARD IS IT FOR YOU TO PAY FOR THE VERY BASICS LIKE FOOD, HOUSING, MEDICAL CARE, AND HEATING?: NOT VERY HARD

## 2023-10-04 SDOH — ECONOMIC STABILITY: INCOME INSECURITY: IN THE LAST 12 MONTHS, WAS THERE A TIME WHEN YOU WERE NOT ABLE TO PAY THE MORTGAGE OR RENT ON TIME?: NO

## 2023-10-04 SDOH — ECONOMIC STABILITY: FOOD INSECURITY: WITHIN THE PAST 12 MONTHS, YOU WORRIED THAT YOUR FOOD WOULD RUN OUT BEFORE YOU GOT MONEY TO BUY MORE.: PATIENT DECLINED

## 2023-10-04 SDOH — ECONOMIC STABILITY: TRANSPORTATION INSECURITY
IN THE PAST 12 MONTHS, HAS LACK OF TRANSPORTATION KEPT YOU FROM MEETINGS, WORK, OR FROM GETTING THINGS NEEDED FOR DAILY LIVING?: NO

## 2023-10-04 SDOH — ECONOMIC STABILITY: FOOD INSECURITY: WITHIN THE PAST 12 MONTHS, THE FOOD YOU BOUGHT JUST DIDN'T LAST AND YOU DIDN'T HAVE MONEY TO GET MORE.: PATIENT DECLINED

## 2023-10-04 SDOH — HEALTH STABILITY: PHYSICAL HEALTH: ON AVERAGE, HOW MANY MINUTES DO YOU ENGAGE IN EXERCISE AT THIS LEVEL?: 50 MIN

## 2023-10-04 SDOH — ECONOMIC STABILITY: TRANSPORTATION INSECURITY
IN THE PAST 12 MONTHS, HAS THE LACK OF TRANSPORTATION KEPT YOU FROM MEDICAL APPOINTMENTS OR FROM GETTING MEDICATIONS?: PATIENT DECLINED

## 2023-10-04 ASSESSMENT — SOCIAL DETERMINANTS OF HEALTH (SDOH)
HOW OFTEN DO YOU ATTEND CHURCH OR RELIGIOUS SERVICES?: NEVER
HOW HARD IS IT FOR YOU TO PAY FOR THE VERY BASICS LIKE FOOD, HOUSING, MEDICAL CARE, AND HEATING?: NOT VERY HARD
ARE YOU MARRIED, WIDOWED, DIVORCED, SEPARATED, NEVER MARRIED, OR LIVING WITH A PARTNER?: NEVER MARRIED
HOW OFTEN DO YOU GET TOGETHER WITH FRIENDS OR RELATIVES?: MORE THAN THREE TIMES A WEEK
IN A TYPICAL WEEK, HOW MANY TIMES DO YOU TALK ON THE PHONE WITH FAMILY, FRIENDS, OR NEIGHBORS?: MORE THAN THREE TIMES A WEEK
WITHIN THE PAST 12 MONTHS, YOU WORRIED THAT YOUR FOOD WOULD RUN OUT BEFORE YOU GOT THE MONEY TO BUY MORE: PATIENT DECLINED
ARE YOU MARRIED, WIDOWED, DIVORCED, SEPARATED, NEVER MARRIED, OR LIVING WITH A PARTNER?: NEVER MARRIED
DO YOU BELONG TO ANY CLUBS OR ORGANIZATIONS SUCH AS CHURCH GROUPS UNIONS, FRATERNAL OR ATHLETIC GROUPS, OR SCHOOL GROUPS?: NO
HOW OFTEN DO YOU ATTENT MEETINGS OF THE CLUB OR ORGANIZATION YOU BELONG TO?: NEVER
IN A TYPICAL WEEK, HOW MANY TIMES DO YOU TALK ON THE PHONE WITH FAMILY, FRIENDS, OR NEIGHBORS?: MORE THAN THREE TIMES A WEEK
HOW OFTEN DO YOU ATTENT MEETINGS OF THE CLUB OR ORGANIZATION YOU BELONG TO?: NEVER
DO YOU BELONG TO ANY CLUBS OR ORGANIZATIONS SUCH AS CHURCH GROUPS UNIONS, FRATERNAL OR ATHLETIC GROUPS, OR SCHOOL GROUPS?: NO
HOW OFTEN DO YOU HAVE SIX OR MORE DRINKS ON ONE OCCASION: MONTHLY
HOW OFTEN DO YOU HAVE A DRINK CONTAINING ALCOHOL: 2-4 TIMES A MONTH
HOW OFTEN DO YOU GET TOGETHER WITH FRIENDS OR RELATIVES?: MORE THAN THREE TIMES A WEEK
HOW MANY DRINKS CONTAINING ALCOHOL DO YOU HAVE ON A TYPICAL DAY WHEN YOU ARE DRINKING: 3 OR 4
HOW OFTEN DO YOU ATTEND CHURCH OR RELIGIOUS SERVICES?: NEVER

## 2023-10-04 ASSESSMENT — LIFESTYLE VARIABLES
SKIP TO QUESTIONS 9-10: 0
HOW MANY STANDARD DRINKS CONTAINING ALCOHOL DO YOU HAVE ON A TYPICAL DAY: 3 OR 4
HOW OFTEN DO YOU HAVE SIX OR MORE DRINKS ON ONE OCCASION: MONTHLY
AUDIT-C TOTAL SCORE: 5
HOW OFTEN DO YOU HAVE A DRINK CONTAINING ALCOHOL: 2-4 TIMES A MONTH

## 2023-10-05 ENCOUNTER — OFFICE VISIT (OUTPATIENT)
Dept: MEDICAL GROUP | Facility: CLINIC | Age: 28
End: 2023-10-05
Payer: COMMERCIAL

## 2023-10-05 VITALS
WEIGHT: 198.8 LBS | HEART RATE: 78 BPM | HEIGHT: 71 IN | OXYGEN SATURATION: 93 % | TEMPERATURE: 97.7 F | BODY MASS INDEX: 27.83 KG/M2 | DIASTOLIC BLOOD PRESSURE: 71 MMHG | SYSTOLIC BLOOD PRESSURE: 107 MMHG

## 2023-10-05 DIAGNOSIS — F32.9 MAJOR DEPRESSIVE DISORDER WITH CURRENT ACTIVE EPISODE, UNSPECIFIED DEPRESSION EPISODE SEVERITY, UNSPECIFIED WHETHER RECURRENT: ICD-10-CM

## 2023-10-05 DIAGNOSIS — Z87.898 H/O GYNECOMASTIA: ICD-10-CM

## 2023-10-05 DIAGNOSIS — F41.1 GAD (GENERALIZED ANXIETY DISORDER): ICD-10-CM

## 2023-10-05 PROBLEM — F33.9 EPISODE OF RECURRENT MAJOR DEPRESSIVE DISORDER (HCC): Status: ACTIVE | Noted: 2023-10-05

## 2023-10-05 PROCEDURE — 3074F SYST BP LT 130 MM HG: CPT

## 2023-10-05 PROCEDURE — 3078F DIAST BP <80 MM HG: CPT

## 2023-10-05 PROCEDURE — 99204 OFFICE O/P NEW MOD 45 MIN: CPT | Mod: GC

## 2023-10-05 RX ORDER — PROMETHAZINE HYDROCHLORIDE 25 MG/1
25 TABLET ORAL EVERY 6 HOURS PRN
COMMUNITY
Start: 2023-09-28 | End: 2023-11-15

## 2023-10-05 RX ORDER — CEPHALEXIN 500 MG/1
500 CAPSULE ORAL 4 TIMES DAILY
COMMUNITY
Start: 2023-09-28 | End: 2023-11-15

## 2023-10-05 RX ORDER — SCOLOPAMINE TRANSDERMAL SYSTEM 1 MG/1
PATCH, EXTENDED RELEASE TRANSDERMAL
COMMUNITY
Start: 2023-09-28 | End: 2023-11-15

## 2023-10-05 RX ORDER — ESCITALOPRAM OXALATE 5 MG/1
TABLET ORAL
COMMUNITY
Start: 2023-08-30 | End: 2023-10-05

## 2023-10-05 RX ORDER — ESCITALOPRAM OXALATE 10 MG/1
TABLET ORAL
COMMUNITY
Start: 2023-07-17 | End: 2023-10-05

## 2023-10-05 RX ORDER — FLUOXETINE 10 MG/1
10 CAPSULE ORAL DAILY
Qty: 90 CAPSULE | Refills: 1 | Status: SHIPPED | OUTPATIENT
Start: 2023-10-05

## 2023-10-05 RX ORDER — FLUOXETINE 10 MG/1
10 CAPSULE ORAL DAILY
Qty: 90 CAPSULE | Refills: 1 | Status: SHIPPED | OUTPATIENT
Start: 2023-10-05 | End: 2023-10-05

## 2023-10-05 RX ORDER — OXYCODONE AND ACETAMINOPHEN 7.5; 325 MG/1; MG/1
1 TABLET ORAL EVERY 6 HOURS PRN
COMMUNITY
Start: 2023-09-28 | End: 2023-11-15

## 2023-10-05 NOTE — ASSESSMENT & PLAN NOTE
Patient feels anxiety is in an improved place right now but believes this to be due to his current depressive symptoms preventing him from feeling anxious. Following SSRI treatment and therapy Ricardo felt improvement in these symptoms and began developing tools to manage his anxiety. Since trial of bupropion he has been more depressed than before. Will switch from lexapro to fluoxetine to trial this SSRI and promote return to scheduled therapy sessions.   -Fluoxetine 10mg  -Discontinue lexapro  -Therapy   -f/u in 4-6 weeks

## 2023-10-05 NOTE — PROGRESS NOTES
"Subjective:     CC: MDD and CATY    HISTORY OF THE PRESENT ILLNESS: Patient is a 28 y.o. male. This pleasant patient is here today to establish care and discuss CATY/MDD.     Problem   Caty (Generalized Anxiety Disorder)    Patient reports a history of CATY diagnosed ~10 months ago in addition to MDD. Has trialed Zoloft which initially was helpful but currently on Lexapro 10mg due to sexual side effects. He had adjunct bupropion added a few months ago but this increased his depressive symptoms and avolition, but did not aid in his anxiety. He currently feels his anxiety is managed but attributes this to him feeling so \"down\" or depressed that he can't get the energy to feel anxious. He has seen a therapist in the past which was helpful but recent cancellations have prevented recent visits, but he plans to get back on the therapists schedule.      H/O Gynecomastia    Patient with history of gynecomastia. Patient reports 2 years ago he had surgical removal of breast tissue on the left side with negative pathology. He had bilateral removal of breast tissue 3 days ago for which he is currently recovering from. He had been on testosterone therapy but has since discontinue this. Reports pain is well controlled with percocet 7.5mg. Bilateral drains present today. Live-in partner is an RN and is aiding with wound care.      Mdd (Major Depressive Disorder)    Patient reports history of MDD, as a child Ricardo reports his pediatricians suggested starting anxiolytic and antidepressants but they were never started. Patient has been on SSRI therapy for ~10 months along with seeing a therapist. Ricardo reports that he has seen benefits from SSRI + therapy, but a couple months ago bupropion was started in addition to SSRI which caused a great deal of depressive symptoms and decreased energy which he is still experiencing despite discontinuation of bupropion. He is currently on lexapro 10mg (down from 15mg) but has also tried zoloft. He " complains of side effects of decreased libido, anorgasmia, and decreased energy with SSRI therapy. He denies suicidal or homicidal ideation, does currently have some percocet available but no other pills and has a firearm which is locked away.          Current Outpatient Medications Ordered in Epic   Medication Sig Dispense Refill    cephALEXin (KEFLEX) 500 MG Cap Take 500 mg by mouth 4 times a day.      oxyCODONE-acetaminophen (PERCOCET) 7.5-325 MG per tablet Take 1 Tablet by mouth every 6 hours as needed.  FOR PAIN      promethazine (PHENERGAN) 25 MG Tab Take 25 mg by mouth every 6 hours as needed.      FLUoxetine (PROZAC) 10 MG Cap Take 1 Capsule by mouth every day. 90 Capsule 1    scopolamine (TRANSDERM-SCOP) 1 mg/72hr PATCH 72 HR APPLY 1 PATCH TO SKIN BEHIND EAR THE NIGHT BEFORE SURGERY (Patient not taking: Reported on 10/5/2023)      isotretinoin (ACCUTANE) 40 MG capsule Take 1 Cap by mouth every day. ipledge Id: [id number] (Patient not taking: Reported on 11/27/2018) 30 Cap 0    fluticasone (FLONASE) 50 MCG/ACT nasal spray Spray 1 Spray in nose every day. Each Nostril (Patient not taking: Reported on 11/27/2018) 1 Bottle 3    methylPREDNISolone (MEDROL DOSPACK) 4 MG TABS Take  by mouth. U. D. (Patient not taking: Reported on 11/27/2018) 1 Each 0    mupirocin (BACTROBAN) 2 % OINT Apply  to affected area(s) 2 times a day. To infected area (Patient not taking: Reported on 11/27/2018) 15 g 0     No current Epic-ordered facility-administered medications on file.       Family History: Reports he has various family members that smoked and have lung cancer. Maternal grandmother with colon cancer. Some members of his family have HTN, obesity and diabetes. He believes he had a family member with valve disease, stents, and h/o MI, but unsure.     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 He frequents the gym and is active in  "softball every week. He drink 1-2 beers 1-2x per week, infrequent marijuana use (1-3x/year, edible, no smoking). He does not smoke but has used Zyns (nicotine) in the past but currently not using. He is in a monogamous relationship with his partner who he lives with. He identifies as a heterosexual male.     Medications: Omeprazole daily, Lexapro switching to Prozac, and multivitamins. He is currently post surgical and using percocet as prescribed for pain management.     Allergies: Sulfa drugs    Surgical History: Gynecomastia surgeries 2 years ago and Oct. 2023. 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     Hospitalizations: Denies    Immunizations: Reported to be up to date, but declines flu shot. COVID vaccinated and boosted.     ROS:   Gen: no fevers/chills, no changes in weight  Eyes: no changes in vision  ENT: no changes in hearing  Pulm: no sob, no cough  CV: no chest pain, no palpitations  GI: no nausea/vomiting, no diarrhea  MSk: no myalgias  Skin: no rash  Neuro: no headaches, no numbness/tingling  PSY: Reported depressed mood, increased sleep, decreased interest, low energy, poor concentration, and some psychomotor agitation (foot tapping)       Objective:       Exam: /71 (BP Location: Right arm, Patient Position: Sitting, BP Cuff Size: Adult)   Pulse 78   Temp 36.5 °C (97.7 °F) (Temporal)   Ht 1.803 m (5' 11\")   Wt 90.2 kg (198 lb 12.8 oz)   SpO2 93%  Body mass index is 27.73 kg/m².    General: Normal appearing. No distress.  HEENT: Normocephalic. Eyes conjunctiva clear lids without ptosis, pupils equal and reactive to light accommodation, ears normal shape and contour, nasal mucosa benign, oropharynx is without erythema, edema or exudates, good dentition.   Neck: Supple without JVD. Thyroid is not enlarged.  Pulmonary: Clear to ausculation.  Normal effort. No rales, ronchi, or wheezing.  Cardiovascular: Regular rate and rhythm without murmur. Carotid and " radial pulses are intact and equal bilaterally.  Abdomen: Soft, nontender, nondistended. Normal bowel sounds. Liver and spleen are not palpable  Neurologic: Grossly nonfocal, CN's 2-12 intact  Lymph: No cervical or supraclavicular lymph nodes are palpable  Skin: Warm and dry.  No obvious lesions.  Musculoskeletal: Normal gait. No obvious abnormalities.  No extremity cyanosis, clubbing, or edema. Post surgical changes noted on bilateral pectoralis muscles, drains leaving the axilla with tubing palpable over breast, minimal serosanguinous drainage noted b/l  Psych: Depressed mood and affect. Alert and oriented x3. Judgment and insight is appropriate.     Labs:     Assessment & Plan:   28 y.o. male with the following -    H/O gynecomastia  Patient has follow up with plastic surgeon Dr. Brush on 10/6. Currently has drains with minimal serosanguinous drainage bilaterally, pain is well controlled.   -Follow up with surgery  -Continue prescribed pain management regimen     MDD (major depressive disorder)  Has trialed Zoloft, currently on lexapro 10mg with complaints of sexual dysfunction and decreased energy. He would like to trial a new SSRI that may be more stimulating. Today he meets 5 SIGECAPS (S,I,E,C,P)Discussions had about atypical antidepressant such as bupropion but patient has tried this and did not tolerate well. Fluoxetine was discussed as it tends to be more stimulating and patient interested in beginning this and discontinuing lexapro. Ricardo will also begin seeing his therapist regularly as this was helping him in the past in addition to SSRI therapy.   -Fluoxetine 10mg, titrate up as tolerated by 5mg after a 3+ days  -Discontinue Lexapro  -Continue with therapy     CATY (generalized anxiety disorder)  Patient feels anxiety is in an improved place right now but believes this to be due to his current depressive symptoms preventing him from feeling anxious. Following SSRI treatment and therapy Ricardo felt  improvement in these symptoms and began developing tools to manage his anxiety. Since trial of bupropion he has been more depressed than before. Will switch from lexapro to fluoxetine to trial this SSRI and promote return to scheduled therapy sessions.   -Fluoxetine 10mg  -Discontinue lexapro  -Therapy   -f/u in 4-6 weeks    Future Appointments         Provider Department Center    11/15/2023 4:00 PM Steven Brice M.D. [facility]          Steven Brice M.D.   Family Medicine Resident PGY-1    Please note that this dictation was created using voice recognition software. I have made every reasonable attempt to correct obvious errors, but I expect that there are errors of grammar and possibly content that I did not discover before finalizing the note.

## 2023-10-05 NOTE — ASSESSMENT & PLAN NOTE
Patient reports history of MDD, as a child Ricardo reports his pediatricians suggested starting anxiolytic and antidepressants but they were never started. Patient has been on SSRI therapy for ~10 months along with seeing a therapist. Ricardo reports that he has seen benefits from SSRI + therapy, but a couple months ago bupropion was started in addition to SSRI which caused a great deal of depressive symptoms and decreased energy which he is still experiencing despite discontinuation of bupropion. He is currently on lexapro 10mg (down from 15mg) but has also tried zoloft. He complains of side effects of decreased libido, anorgasmia, and decreased energy with SSRI therapy. He denies suicidal or homicidal ideation, does currently have some percocet available but no other pills and has a firearm which is locked away.

## 2023-10-05 NOTE — ASSESSMENT & PLAN NOTE
Has trialed Zoloft, currently on lexapro 10mg with complaints of sexual dysfunction and decreased energy. He would like to trial a new SSRI that may be more stimulating. Today he meets 5 SIGECAPS (S,I,E,C,P)Discussions had about atypical antidepressant such as bupropion but patient has tried this and did not tolerate well. Fluoxetine was discussed as it tends to be more stimulating and patient interested in beginning this and discontinuing lexapro. Ricardo will also begin seeing his therapist regularly as this was helping him in the past in addition to SSRI therapy.   -Fluoxetine 10mg, titrate up as tolerated by 5mg after a 3+ days  -Discontinue Lexapro  -Continue with therapy

## 2023-10-05 NOTE — ASSESSMENT & PLAN NOTE
Patient has follow up with plastic surgeon Dr. Brush on 10/6. Currently has drains with minimal serosanguinous drainage bilaterally, pain is well controlled.   -Follow up with surgery  -Continue prescribed pain management regimen

## 2023-11-15 ENCOUNTER — OFFICE VISIT (OUTPATIENT)
Dept: MEDICAL GROUP | Facility: CLINIC | Age: 28
End: 2023-11-15
Payer: COMMERCIAL

## 2023-11-15 VITALS
SYSTOLIC BLOOD PRESSURE: 118 MMHG | DIASTOLIC BLOOD PRESSURE: 70 MMHG | OXYGEN SATURATION: 98 % | WEIGHT: 199 LBS | HEART RATE: 70 BPM | BODY MASS INDEX: 28.49 KG/M2 | TEMPERATURE: 98.8 F | HEIGHT: 70 IN

## 2023-11-15 DIAGNOSIS — F41.1 GAD (GENERALIZED ANXIETY DISORDER): ICD-10-CM

## 2023-11-15 PROCEDURE — 3074F SYST BP LT 130 MM HG: CPT

## 2023-11-15 PROCEDURE — 99213 OFFICE O/P EST LOW 20 MIN: CPT | Mod: GE

## 2023-11-15 PROCEDURE — 3078F DIAST BP <80 MM HG: CPT

## 2023-11-15 RX ORDER — FLUOXETINE HYDROCHLORIDE 20 MG/1
20 CAPSULE ORAL DAILY
Qty: 90 CAPSULE | Refills: 3 | Status: SHIPPED | OUTPATIENT
Start: 2023-11-15

## 2023-11-15 RX ORDER — MODAFINIL 100 MG/1
100 TABLET ORAL 2 TIMES DAILY
COMMUNITY
Start: 2023-09-20 | End: 2023-11-15

## 2023-11-15 RX ORDER — BUPROPION HYDROCHLORIDE 150 MG/1
TABLET ORAL
COMMUNITY
Start: 2023-08-30 | End: 2023-11-15

## 2023-11-15 RX ORDER — ANASTROZOLE 1 MG/1
1 TABLET ORAL
COMMUNITY
End: 2023-11-15

## 2023-11-15 ASSESSMENT — PATIENT HEALTH QUESTIONNAIRE - PHQ9
SUM OF ALL RESPONSES TO PHQ QUESTIONS 1-9: 7
5. POOR APPETITE OR OVEREATING: 1 - SEVERAL DAYS
CLINICAL INTERPRETATION OF PHQ2 SCORE: 1

## 2023-11-15 ASSESSMENT — ANXIETY QUESTIONNAIRES
4. TROUBLE RELAXING: SEVERAL DAYS
5. BEING SO RESTLESS THAT IT IS HARD TO SIT STILL: NOT AT ALL
3. WORRYING TOO MUCH ABOUT DIFFERENT THINGS: NOT AT ALL
6. BECOMING EASILY ANNOYED OR IRRITABLE: MORE THAN HALF THE DAYS
GAD7 TOTAL SCORE: 5
2. NOT BEING ABLE TO STOP OR CONTROL WORRYING: NOT AT ALL
1. FEELING NERVOUS, ANXIOUS, OR ON EDGE: SEVERAL DAYS
7. FEELING AFRAID AS IF SOMETHING AWFUL MIGHT HAPPEN: SEVERAL DAYS

## 2023-11-16 NOTE — PROGRESS NOTES
"Subjective:     CC:   Chief Complaint   Patient presents with    Follow-Up       HPI:   Ricardo is a 28 y.o. male presents today with:    Problem   Carlos Manuel (Generalized Anxiety Disorder)    Patient with roughly 1 year history of CARLOS MANUEL in addition to MDD.  Initially was prescribed Zoloft and Lexapro which she discontinued due to sexual side effects.  He also had an adjunct of bupropion added but he reports this did not do anything for his anxiety.  He also had felt his energy was low.  He also had been seeing a therapist in the past which she endorses being very helpful, but he has not seen his therapist in many months.  He would like to continue seeing his therapist.  On 10/5 I prescribed fluoxetine 10 mg which was self titrated to 20 mg under my instruction, patient reports this has been very helpful for his anxiety, he also reports this has helped with his energy.         Current Outpatient Medications Ordered in Epic   Medication Sig Dispense Refill    FLUoxetine (PROZAC) 20 MG Cap Take 1 Capsule by mouth every day. 90 Capsule 3    FLUoxetine (PROZAC) 10 MG Cap Take 1 Capsule by mouth every day. 90 Capsule 1     No current Epic-ordered facility-administered medications on file.       ROS:  Negative except for above in HPI    Objective:     Exam:  /70 (BP Location: Right arm, Patient Position: Sitting, BP Cuff Size: Adult)   Pulse 70   Temp 37.1 °C (98.8 °F) (Temporal)   Ht 1.778 m (5' 10\")   Wt 90.3 kg (199 lb)   SpO2 98%   BMI 28.55 kg/m²  Body mass index is 28.55 kg/m².    Gen: Alert and oriented, No apparent distress.  HEENT: NCAT, MMM, No lymphadenopathy  Lungs: Normal effort, CTA bilaterally, no wheezes, rhonchi, or rales  CV: Regular rate and rhythm, one PVC appreciated in roughly 40 seconds of auscultation. No murmurs, rubs, or gallops. Radial pulses palpable bilat  Abd: Soft, non-distended, no guarding, no rebound, non-tender to palpation  Ext: No clubbing, cyanosis, edema.  Neuro: Non-focal    Labs:No " new labs    Assessment & Plan:     28 y.o. male with the following -     Problem List Items Addressed This Visit       CATY (generalized anxiety disorder)     CATY-7 reviewed, score is 5 which correlates with mild anxiety and improved from his previous score.  Patient's PHQ-9 scored at a 7 which correlates with mild depression.  Much of the score stems from inability to concentrate which he reports he has been suffering from his whole life.  Patient reports subjective improvement in anxiety, stating he feels nervous/anxious or on edge far less frequently than before and no longer is worrying too much about different things.  He is also finding it easier to relax during the day.  He does report that he has been feeling more easily annoyed recently, but this is also improved overall.  He is currently on 20 mg of fluoxetine which she reports no GI or sexual side effects, and feels it gives him a little more energy throughout the day.  Patient reports that he benefited from therapy in the past, but would not like to do therapy once weekly which was his previous schedule.  He will be contacting therapist to reschedule appointment when he feels he has time.  Patient has a healthy diet, good relationship with family and girlfriend, and gets plenty of exercise.  He reports his sleep schedule is regular and also improving with decreasing anxiety.  -Continue fluoxetine 20 mg  -Patient will contact this provider before increasing dose to 30 mg  -Provider promoted follow-up with therapist  -Follow-up in 2 months         Relevant Medications    FLUoxetine (PROZAC) 20 MG Cap       Future Appointments         Provider Department Center    1/10/2024 3:00 PM Steven Brice M.D. [facility] Lala Brice MD  Resident - PGY1  [facility]

## 2023-11-16 NOTE — ASSESSMENT & PLAN NOTE
CATY-7 reviewed, score is 5 which correlates with mild anxiety and improved from his previous score.  Patient's PHQ-9 scored at a 7 which correlates with mild depression.  Much of the score stems from inability to concentrate which he reports he has been suffering from his whole life.  Patient reports subjective improvement in anxiety, stating he feels nervous/anxious or on edge far less frequently than before and no longer is worrying too much about different things.  He is also finding it easier to relax during the day.  He does report that he has been feeling more easily annoyed recently, but this is also improved overall.  He is currently on 20 mg of fluoxetine which she reports no GI or sexual side effects, and feels it gives him a little more energy throughout the day.  Patient reports that he benefited from therapy in the past, but would not like to do therapy once weekly which was his previous schedule.  He will be contacting therapist to reschedule appointment when he feels he has time.  Patient has a healthy diet, good relationship with family and girlfriend, and gets plenty of exercise.  He reports his sleep schedule is regular and also improving with decreasing anxiety.  -Continue fluoxetine 20 mg  -Patient will contact this provider before increasing dose to 30 mg  -Provider promoted follow-up with therapist  -Follow-up in 2 months

## 2024-01-10 ENCOUNTER — APPOINTMENT (OUTPATIENT)
Dept: MEDICAL GROUP | Facility: CLINIC | Age: 29
End: 2024-01-10
Payer: COMMERCIAL

## 2024-02-15 ENCOUNTER — APPOINTMENT (RX ONLY)
Dept: URBAN - METROPOLITAN AREA CLINIC 6 | Facility: CLINIC | Age: 29
Setting detail: DERMATOLOGY
End: 2024-02-15

## 2024-02-15 DIAGNOSIS — D22 MELANOCYTIC NEVI: ICD-10-CM

## 2024-02-15 DIAGNOSIS — D485 NEOPLASM OF UNCERTAIN BEHAVIOR OF SKIN: ICD-10-CM

## 2024-02-15 DIAGNOSIS — D18.0 HEMANGIOMA: ICD-10-CM

## 2024-02-15 DIAGNOSIS — L82.1 OTHER SEBORRHEIC KERATOSIS: ICD-10-CM

## 2024-02-15 DIAGNOSIS — L81.4 OTHER MELANIN HYPERPIGMENTATION: ICD-10-CM

## 2024-02-15 DIAGNOSIS — Z71.89 OTHER SPECIFIED COUNSELING: ICD-10-CM

## 2024-02-15 PROBLEM — D18.01 HEMANGIOMA OF SKIN AND SUBCUTANEOUS TISSUE: Status: ACTIVE | Noted: 2024-02-15

## 2024-02-15 PROBLEM — D48.5 NEOPLASM OF UNCERTAIN BEHAVIOR OF SKIN: Status: ACTIVE | Noted: 2024-02-15

## 2024-02-15 PROBLEM — D22.5 MELANOCYTIC NEVI OF TRUNK: Status: ACTIVE | Noted: 2024-02-15

## 2024-02-15 PROBLEM — D23.39 OTHER BENIGN NEOPLASM OF SKIN OF OTHER PARTS OF FACE: Status: ACTIVE | Noted: 2024-02-15

## 2024-02-15 PROCEDURE — ? EDUCATIONAL RESOURCES PROVIDED

## 2024-02-15 PROCEDURE — ? BIOPSY BY SHAVE METHOD

## 2024-02-15 PROCEDURE — 99203 OFFICE O/P NEW LOW 30 MIN: CPT | Mod: 25

## 2024-02-15 PROCEDURE — 11102 TANGNTL BX SKIN SINGLE LES: CPT

## 2024-02-15 PROCEDURE — ? SUNSCREEN RECOMMENDATIONS

## 2024-02-15 PROCEDURE — ? COUNSELING

## 2024-02-15 ASSESSMENT — LOCATION SIMPLE DESCRIPTION DERM
LOCATION SIMPLE: LEFT CHEEK
LOCATION SIMPLE: LEFT HAND
LOCATION SIMPLE: SCALP
LOCATION SIMPLE: ABDOMEN
LOCATION SIMPLE: RIGHT HAND
LOCATION SIMPLE: CHEST

## 2024-02-15 ASSESSMENT — LOCATION DETAILED DESCRIPTION DERM
LOCATION DETAILED: STERNUM
LOCATION DETAILED: LEFT MEDIAL INFERIOR CHEST
LOCATION DETAILED: LEFT INFERIOR CENTRAL MALAR CHEEK
LOCATION DETAILED: EPIGASTRIC SKIN
LOCATION DETAILED: PERIUMBILICAL SKIN
LOCATION DETAILED: LEFT ULNAR DORSAL HAND
LOCATION DETAILED: RIGHT RADIAL DORSAL HAND
LOCATION DETAILED: LEFT SUPERIOR PARIETAL SCALP

## 2024-02-15 ASSESSMENT — LOCATION ZONE DERM
LOCATION ZONE: SCALP
LOCATION ZONE: HAND
LOCATION ZONE: TRUNK
LOCATION ZONE: FACE

## 2024-05-30 ENCOUNTER — OFFICE VISIT (OUTPATIENT)
Dept: MEDICAL GROUP | Facility: CLINIC | Age: 29
End: 2024-05-30
Payer: COMMERCIAL

## 2024-05-30 VITALS
RESPIRATION RATE: 16 BRPM | OXYGEN SATURATION: 98 % | SYSTOLIC BLOOD PRESSURE: 123 MMHG | DIASTOLIC BLOOD PRESSURE: 75 MMHG | HEIGHT: 70 IN | BODY MASS INDEX: 28.63 KG/M2 | WEIGHT: 200 LBS | HEART RATE: 80 BPM

## 2024-05-30 DIAGNOSIS — Z83.438 FAMILY HISTORY OF HYPERLIPIDEMIA: ICD-10-CM

## 2024-05-30 DIAGNOSIS — Z83.42 FAMILY HISTORY OF HYPERCHOLESTEROLEMIA: ICD-10-CM

## 2024-05-30 DIAGNOSIS — R53.83 LETHARGY: ICD-10-CM

## 2024-05-30 DIAGNOSIS — F41.1 GAD (GENERALIZED ANXIETY DISORDER): ICD-10-CM

## 2024-05-30 ASSESSMENT — ANXIETY QUESTIONNAIRES
3. WORRYING TOO MUCH ABOUT DIFFERENT THINGS: NOT AT ALL
7. FEELING AFRAID AS IF SOMETHING AWFUL MIGHT HAPPEN: SEVERAL DAYS
2. NOT BEING ABLE TO STOP OR CONTROL WORRYING: SEVERAL DAYS
GAD7 TOTAL SCORE: 6
1. FEELING NERVOUS, ANXIOUS, OR ON EDGE: SEVERAL DAYS
5. BEING SO RESTLESS THAT IT IS HARD TO SIT STILL: NOT AT ALL
4. TROUBLE RELAXING: NOT AT ALL
IF YOU CHECKED OFF ANY PROBLEMS ON THIS QUESTIONNAIRE, HOW DIFFICULT HAVE THESE PROBLEMS MADE IT FOR YOU TO DO YOUR WORK, TAKE CARE OF THINGS AT HOME, OR GET ALONG WITH OTHER PEOPLE: SOMEWHAT DIFFICULT
6. BECOMING EASILY ANNOYED OR IRRITABLE: NEARLY EVERY DAY

## 2024-05-30 ASSESSMENT — PATIENT HEALTH QUESTIONNAIRE - PHQ9
SUM OF ALL RESPONSES TO PHQ QUESTIONS 1-9: 15
CLINICAL INTERPRETATION OF PHQ2 SCORE: 4
5. POOR APPETITE OR OVEREATING: 0 - NOT AT ALL

## 2024-05-30 NOTE — PROGRESS NOTES
Subjective:     CC:   Chief Complaint   Patient presents with    Annual Exam    Medication Management       HPI:   Ricardo is a 28 y.o. male presents today with:    Problem   Family History of Hyperlipidemia    Patient with family history of hypercholesterolemia in both maternal and paternal side.  Multiple family members on statins.  Had a lipid panel in 2018 with elevated LDL and low HDL.  No chest pain, no shortness of breath.  He is active going to the gym many times per week and generally has a healthy diet as well with many home-cooked meals, lean proteins, and vegetables.     Carlos Manuel (Generalized Anxiety Disorder)    Feels he has improved but is having difficulties with motivation. He also feels tired all the time. Recently started CPAP but not noticing much improvement but has been managing his mask fitting and trying new ones. He has been compliant on prozac, currently on 30mg. He feels his stress and anxiety is improved a lot overall. He feels his decreased anxiety is contributing to his decreased motivation at this time.  He has also noticed more impulsivity in his day-to-day life; he states that at work or in other settings he is more willing to speak his mind despite possible consequences of that.  Overall he believes Prozac has been very helpful for his anxiety and believes he is in a better place than he was 6 months ago/on Lexapro.  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  At this time he has no perceived side effects as his libido has improved as well.         Current Outpatient Medications Ordered in Epic   Medication Sig Dispense Refill    FLUoxetine (PROZAC) 20 MG Cap Take 1 Capsule by mouth every day. 90 Capsule 3     No current [facility]-ordered facility-administered medications on file.       ROS:  Negative except for above in HPI    Objective:     Exam:  /75 (BP Location: Left arm, Patient Position: Sitting, BP Cuff Size:  "Adult)   Pulse 80   Resp 16   Ht 1.778 m (5' 10\")   Wt 90.7 kg (200 lb)   SpO2 98%   BMI 28.70 kg/m²  Body mass index is 28.7 kg/m².    Gen: Alert and oriented, No apparent distress.  HEENT: NCAT, MMM, No lymphadenopathy  Lungs: Normal effort, CTA bilaterally, no wheezes, rhonchi, or rales  CV: Regular rate and rhythm. No murmurs, rubs, or gallops. Radial pulses palpable bilat  Abd: Soft, non-distended, no guarding, no rebound, non-tender to palpation  Ext: No clubbing, cyanosis, edema.  Neuro: Non-focal        5/30/2024     9:08 AM 11/15/2023     6:28 PM    CATY-7 ANXIETY SCALE FLOWSHEET   Feeling nervous, anxious, or on edge 1 1   Not being able to stop or control worrying 1 0   Worrying too much about different things 0 0   Trouble relaxing 0 1   Being so restless that it is hard to sit still 0 0   Becoming easily annoyed or irritable 3 2   Feeling afraid as if something awful might happen 1 1   CATY-7 Total Score 6 5   How difficult have these problems made it for you to do your work, take care of things at home, or get along with other people? Somewhat difficult        Interpretation of CATY-7 Total Score   Score Severity   0-4 Minimal Anxiety  5-9 Mild Anxiety   10-14 Moderate Anxiety  15-21 Severy Anxiety        5/30/2024     9:00 AM 11/15/2023     4:00 PM   PHQ-9 Screening   Little interest or pleasure in doing things 3 - nearly every day 1 - several days   Feeling down, depressed, or hopeless 1 - several days 0 - not at all   Trouble falling or staying asleep, or sleeping too much 3 - nearly every day 1 - several days   Feeling tired or having little energy 3 - nearly every day 1 - several days   Poor appetite or overeating 0 - not at all 1 - several days   Feeling bad about yourself - or that you are a failure or have let yourself or your family down 2 - more than half the days 0 - not at all   Trouble concentrating on things, such as reading the newspaper or watching television 3 - nearly every day 3 " - nearly every day   Moving or speaking so slowly that other people could have noticed. Or the opposite - being so fidgety or restless that you have been moving around a lot more than usual 0 - not at all 0 - not at all   Thoughts that you would be better off dead, or of hurting yourself in some way 0 - not at all 0 - not at all   PHQ-2 Total Score 4 1   PHQ-9 Total Score 15 7       Interpretation of PHQ-9 Total Score   Score Severity   1-4 No Depression   5-9 Mild Depression   10-14 Moderate Depression   15-19 Moderately Severe Depression   20-27 Severe Depression      Labs: No new labs    Assessment & Plan:     28 y.o. male with the following -     Problem List Items Addressed This Visit       CATY (generalized anxiety disorder)     CATY-7 performed today roughly stable from November 2023.  Scored a 5 in November 2023, 6 today.  He perceives that he is improved though.  PHQ performed today was much higher than PHQ in November 2023; 7->15 today.  He denies any depression and many of the scoring is related to trouble sleeping, feeling tired, and trouble concentrating on things.  He is noticing more issues with his attention of late.  Additionally he takes lots of caffeine both in the morning and in the afternoon which she was advised to decrease as this may help his sleep.  Furthermore he is following with sleep medicine for his CPAP and trying to get a mask that he feels comfortable wearing overnight.  As these improve I anticipate his sleep improves and PHQ scoring will decrease and do not think his current PHQ score of 15 is due to depression.  He is currently on 30 mg Prozac which he reports is greatly improved his anxiety symptoms but is now noticing some attention deficits.  He has an appointment with psychiatry on 6/5/2024 and will discuss ADHD workup with them at that time which I agree with.  -Continue Prozac 30 mg with potential to increase to 40 mg  -Continue therapy and counseling  -Follow-up with  psychiatry  -Follow-up as needed in this clinic         Family history of hyperlipidemia     Patient with significant family history on both sides of hypercholesterolemia requiring statin therapy.  LDL was elevated in 2018 at 107 is a 22-year-old and HDL low at 36; cholesterol within normal limits at 161.  Given his abnormal lipid panel before and strong family history requiring statin therapy we will get a lipid panel now.  Patient was instructed to fast for this and will return to clinic following lab draw.  -Lipid panel, performed fasting  -Return to clinic to review results and ASCVD scoring         Relevant Orders    Lipid Profile       Steven Brice MD  Resident - PGY1  [facility]

## 2024-05-30 NOTE — ASSESSMENT & PLAN NOTE
CATY-7 performed today roughly stable from November 2023.  Scored a 5 in November 2023, 6 today.  He perceives that he is improved though.  PHQ performed today was much higher than PHQ in November 2023; 7->15 today.  He denies any depression and many of the scoring is related to trouble sleeping, feeling tired, and trouble concentrating on things.  He is noticing more issues with his attention of late.  Additionally he takes lots of caffeine both in the morning and in the afternoon which she was advised to decrease as this may help his sleep.  Furthermore he is following with sleep medicine for his CPAP and trying to get a mask that he feels comfortable wearing overnight.  As these improve I anticipate his sleep improves and PHQ scoring will decrease and do not think his current PHQ score of 15 is due to depression.  He is currently on 30 mg Prozac which he reports is greatly improved his anxiety symptoms but is now noticing some attention deficits.  He has an appointment with psychiatry on 6/5/2024 and will discuss ADHD workup with them at that time which I agree with.  -Continue Prozac 30 mg with potential to increase to 40 mg  -Continue therapy and counseling  -Follow-up with psychiatry  -Follow-up as needed in this clinic

## 2024-05-30 NOTE — ASSESSMENT & PLAN NOTE
Patient with significant family history on both sides of hypercholesterolemia requiring statin therapy.  LDL was elevated in 2018 at 107 is a 22-year-old and HDL low at 36; cholesterol within normal limits at 161.  Given his abnormal lipid panel before and strong family history requiring statin therapy we will get a lipid panel now.  Patient was instructed to fast for this and will return to clinic following lab draw.  -Lipid panel, performed fasting  -Return to clinic to review results and ASCVD scoring

## 2024-12-23 ENCOUNTER — OFFICE VISIT (OUTPATIENT)
Dept: URGENT CARE | Facility: CLINIC | Age: 29
End: 2024-12-23
Payer: COMMERCIAL

## 2024-12-23 VITALS
TEMPERATURE: 97.6 F | WEIGHT: 195 LBS | HEART RATE: 97 BPM | RESPIRATION RATE: 16 BRPM | SYSTOLIC BLOOD PRESSURE: 146 MMHG | BODY MASS INDEX: 27.3 KG/M2 | HEIGHT: 71 IN | OXYGEN SATURATION: 100 % | DIASTOLIC BLOOD PRESSURE: 80 MMHG

## 2024-12-23 DIAGNOSIS — L05.01 PILONIDAL CYST WITH ABSCESS: ICD-10-CM

## 2024-12-23 PROCEDURE — 3079F DIAST BP 80-89 MM HG: CPT

## 2024-12-23 PROCEDURE — 3077F SYST BP >= 140 MM HG: CPT

## 2024-12-23 PROCEDURE — 10081 I&D PILONIDAL CYST COMP: CPT

## 2024-12-23 RX ORDER — DESVENLAFAXINE SUCCINATE 100 MG/1
TABLET, EXTENDED RELEASE ORAL
COMMUNITY
Start: 2024-07-01

## 2024-12-23 RX ORDER — DOXYCYCLINE HYCLATE 100 MG
100 TABLET ORAL 2 TIMES DAILY
Qty: 10 TABLET | Refills: 0 | Status: SHIPPED | OUTPATIENT
Start: 2024-12-23 | End: 2024-12-28

## 2024-12-23 ASSESSMENT — ENCOUNTER SYMPTOMS
BLURRED VISION: 0
SORE THROAT: 0
COUGH: 0
HEADACHES: 0
VOMITING: 0
DIARRHEA: 0
FEVER: 0
MYALGIAS: 0
SHORTNESS OF BREATH: 0
DOUBLE VISION: 0
TINGLING: 0
NAUSEA: 0
PALPITATIONS: 0
CHILLS: 0
DIZZINESS: 0
WEAKNESS: 0

## 2024-12-23 NOTE — LETTER
December 23, 2024         Patient: Ricardo Bess   YOB: 1995   Date of Visit: 12/23/2024           To Whom it May Concern:    Ricardo Bess was seen in my clinic on 12/23/2024. He may return to work on 12/29/2025 or sooner if he feels better.    If you have any questions or concerns, please don't hesitate to call.        Sincerely,           SEGNUDO Light.  Electronically Signed

## 2024-12-24 NOTE — PROCEDURES
I and D    Date/Time: 12/23/2024 5:01 PM    Performed by: MCKAY Light  Authorized by: MCKAY Light  Type: abscess  Location: Right side of gluteal cleft.  Anesthesia: local infiltration    Anesthesia:  Local Anesthetic: lidocaine 1% with epinephrine  Anesthetic total: 5 mL    Sedation:  Patient sedated: no    Scalpel size: 11  Incision type: single straight  Incision depth: subcutaneous  Complexity: simple  Drainage: purulent and serosanguinous  Drainage amount: moderate  Wound treatment: wound left open  Packing material: 1/4 in gauze  Patient tolerance: patient tolerated the procedure well with no immediate complications

## 2024-12-24 NOTE — PROGRESS NOTES
Subjective:   Ricardo Bess is a 29 y.o. male who presents for Cyst (X1wk, cyst on buttocks, painful, getting worse)    Patient presents to the clinic for complaints of possible cyst in his buttocks.  Patient states that he noticed he had a tender spot within his gluteal cleft on the right side that has been getting bigger, more tender, and more red over the last week.  He states he feels like this is a cyst as he feels some fluid or pus under the skin.  Has taken no medications for his current symptoms.  Patient denies fever, chills, racing heart rate, palpitations, headaches, numbness/tingling, or any abnormalities with stooling or urination.    Cyst  Pertinent negatives include no chest pain, chills, congestion, coughing, fever, headaches, myalgias, nausea, sore throat, vomiting or weakness.       Review of Systems   Constitutional:  Negative for chills and fever.   HENT:  Negative for congestion, ear pain and sore throat.    Eyes:  Negative for blurred vision and double vision.   Respiratory:  Negative for cough and shortness of breath.    Cardiovascular:  Negative for chest pain and palpitations.   Gastrointestinal:  Negative for diarrhea, nausea and vomiting.   Genitourinary:  Negative for dysuria.   Musculoskeletal:  Negative for myalgias.   Skin:         Red and tender spot with fluid collection in the right side of the gluteal cleft.   Neurological:  Negative for dizziness, tingling, weakness and headaches.   All other systems reviewed and are negative.    Refer to HPI for additional details.    During this visit, appropriate PPE was worn, and hand hygiene was performed.    PMH:  has no past medical history on file.    MEDS:   Current Outpatient Medications:     PRISTIQ 100 MG TABLET SR 24 HR, , Disp: , Rfl:     doxycycline (VIBRAMYCIN) 100 MG Tab, Take 1 Tablet by mouth 2 times a day for 5 days., Disp: 10 Tablet, Rfl: 0    FLUoxetine (PROZAC) 20 MG Cap, Take 1 Capsule by mouth every day. (Patient not  "taking: Reported on 12/23/2024), Disp: 90 Capsule, Rfl: 3    ALLERGIES:   Allergies   Allergen Reactions    Sulfa Drugs      SURGHX:   Past Surgical History:   Procedure Laterality Date    EXCISION GYNECOMASTIA Bilateral     HERNIA REPAIR       SOCHX:  reports that he has never smoked. He quit smokeless tobacco use about 14 months ago.  His smokeless tobacco use included chew. He reports current alcohol use of about 1.2 oz of alcohol per week. He reports that he does not use drugs.    FH: Per HPI as applicable/pertinent.    Medications, Allergies, and current problem list reviewed today in Epic.     Objective:     BP (!) 146/80 (BP Location: Left arm, Patient Position: Sitting, BP Cuff Size: Adult)   Pulse 97   Temp 36.4 °C (97.6 °F) (Temporal)   Resp 16   Ht 1.803 m (5' 11\")   Wt 88.5 kg (195 lb)   SpO2 100%     Physical Exam  Constitutional:       Appearance: Normal appearance. He is not ill-appearing or toxic-appearing.   HENT:      Head: Normocephalic.      Right Ear: External ear normal.      Left Ear: External ear normal.      Nose: Nose normal. No congestion or rhinorrhea.      Mouth/Throat:      Mouth: Mucous membranes are moist.      Pharynx: Oropharynx is clear. No oropharyngeal exudate or posterior oropharyngeal erythema.   Eyes:      General:         Right eye: No discharge.         Left eye: No discharge.      Extraocular Movements: Extraocular movements intact.      Conjunctiva/sclera: Conjunctivae normal.      Pupils: Pupils are equal, round, and reactive to light.   Cardiovascular:      Rate and Rhythm: Normal rate and regular rhythm.      Pulses: Normal pulses.      Heart sounds: Normal heart sounds. No murmur heard.  Pulmonary:      Effort: Pulmonary effort is normal. No respiratory distress.      Breath sounds: Normal breath sounds. No wheezing or rhonchi.   Abdominal:      General: Abdomen is flat.   Musculoskeletal:         General: No signs of injury. Normal range of motion.      Cervical " back: Normal range of motion. No rigidity.   Lymphadenopathy:      Cervical: No cervical adenopathy.   Skin:     General: Skin is warm and dry.      Comments: Red and tender area to the right side within the gluteal cleft superior to the anus.  Fluctuance palpable under the skin consistent with cyst or abscess.  No breaks in the skin.   Neurological:      General: No focal deficit present.      Mental Status: He is alert and oriented to person, place, and time.      Motor: No weakness.         Assessment/Plan:     Diagnosis and associated orders:     1. Pilonidal cyst with abscess  - I and D  - doxycycline (VIBRAMYCIN) 100 MG Tab; Take 1 Tablet by mouth 2 times a day for 5 days.  Dispense: 10 Tablet; Refill: 0    Other orders  - PRISTIQ 100 MG TABLET SR 24 HR     Comments/MDM:     Incision and drainage on pilonidal cyst performed in the clinic.  Wound packed with quarter inch gauze and Tegaderm placed on top.  Advised patient to keep area clean and dry until follow-up.  Can take Tylenol and ibuprofen for discomfort and pain from the site.  Doxycycline 5-day course described for the patient.  Follow-up appointment scheduled with the  prior to discharge.  Patient agreeable to this plan of care.  All questions answered.  RTC if symptoms persist and/or worsen.  Red flag symptoms warranting emergency medical services discussed, including but not limited to chest pain, SOB, fever/chills, palpitations or racing heart rate, increased pain/redness/tenderness/swelling with the I&D site, or difficulty breathing/swallowing and dizziness or lightheadedness.         Differential diagnosis, natural history, supportive care, and indications for immediate follow-up discussed.    Advised the patient to follow-up with the primary care physician for recheck, reevaluation, and consideration of further management.    Instructed patient to seek emergency medical attention via calling EMS or going to the Emergency Room for red  flag symptoms, including but not limited to: chest pain, palpitations, fever greater than 103F, shortness of breath, wheezing, new or worsened numbness/tingling, focal or unilateral weakness, and bloody vomit/stool.     Please note that this dictation was created using voice recognition software. I have made a reasonable attempt to correct obvious errors, but I expect that there are errors of grammar and possibly content that I did not discover before finalizing the note.    This note was electronically signed by MCKAY Light

## 2024-12-26 ENCOUNTER — OFFICE VISIT (OUTPATIENT)
Dept: URGENT CARE | Facility: CLINIC | Age: 29
End: 2024-12-26
Payer: COMMERCIAL

## 2024-12-26 VITALS
HEIGHT: 71 IN | BODY MASS INDEX: 27.3 KG/M2 | TEMPERATURE: 97.9 F | OXYGEN SATURATION: 97 % | SYSTOLIC BLOOD PRESSURE: 148 MMHG | RESPIRATION RATE: 16 BRPM | HEART RATE: 84 BPM | WEIGHT: 195 LBS | DIASTOLIC BLOOD PRESSURE: 88 MMHG

## 2024-12-26 DIAGNOSIS — Z09 ENCOUNTER FOR RECHECK OF ABSCESS FOLLOWING INCISION AND DRAINAGE: ICD-10-CM

## 2024-12-26 PROCEDURE — 3077F SYST BP >= 140 MM HG: CPT

## 2024-12-26 PROCEDURE — 99213 OFFICE O/P EST LOW 20 MIN: CPT

## 2024-12-26 PROCEDURE — 3079F DIAST BP 80-89 MM HG: CPT

## 2024-12-26 ASSESSMENT — ENCOUNTER SYMPTOMS
HEADACHES: 0
CHILLS: 0
DIARRHEA: 0
NAUSEA: 0
MYALGIAS: 0
DIZZINESS: 0
PALPITATIONS: 0
SORE THROAT: 0
COUGH: 0
VOMITING: 0
TINGLING: 0
SHORTNESS OF BREATH: 0
WEAKNESS: 0
FEVER: 0
DOUBLE VISION: 0
BLURRED VISION: 0

## 2024-12-26 NOTE — PROGRESS NOTES
Subjective:   Ricardo Bess is a 29 y.o. male who presents for Wound Check (Cyst repack/wound check)    Patient presents to the clinic for complaints of packing from his I&D falling out.  Patient have a pilonidal cyst I&D by this provider on 12/23, which was subsequently packed and dressed at that time.  Patient states he was taking off the bandage that was on their and attempting to do wound care, which caused the packing to fall out.  Has taken the prescribed antibiotics per order.  Patient denies increased tenderness in the wound, increased erythema in the area, pus or fluid collection in the wound, fever, or chills.    Wound Check        Review of Systems   Constitutional:  Negative for chills and fever.   HENT:  Negative for congestion, ear pain and sore throat.    Eyes:  Negative for blurred vision and double vision.   Respiratory:  Negative for cough and shortness of breath.    Cardiovascular:  Negative for chest pain and palpitations.   Gastrointestinal:  Negative for diarrhea, nausea and vomiting.   Genitourinary:  Negative for dysuria.   Musculoskeletal:  Negative for myalgias.   Skin:         I&D site in the left side of the gluteal cleft   Neurological:  Negative for dizziness, tingling, weakness and headaches.   All other systems reviewed and are negative.    Refer to HPI for additional details.    During this visit, appropriate PPE was worn, and hand hygiene was performed.    PMH:  has no past medical history on file.    MEDS:   Current Outpatient Medications:     PRISTIQ 100 MG TABLET SR 24 HR, , Disp: , Rfl:     doxycycline (VIBRAMYCIN) 100 MG Tab, Take 1 Tablet by mouth 2 times a day for 5 days., Disp: 10 Tablet, Rfl: 0    FLUoxetine (PROZAC) 20 MG Cap, Take 1 Capsule by mouth every day., Disp: 90 Capsule, Rfl: 3    ALLERGIES:   Allergies   Allergen Reactions    Sulfa Drugs      SURGHX:   Past Surgical History:   Procedure Laterality Date    EXCISION GYNECOMASTIA Bilateral     HERNIA REPAIR    "    SOCHX:  reports that he has never smoked. He quit smokeless tobacco use about 14 months ago.  His smokeless tobacco use included chew. He reports current alcohol use of about 1.2 oz of alcohol per week. He reports that he does not use drugs.    FH: Per HPI as applicable/pertinent.    Medications, Allergies, and current problem list reviewed today in Epic.     Objective:     BP (!) 148/88 (BP Location: Left arm, Patient Position: Sitting, BP Cuff Size: Adult long)   Pulse 84   Temp 36.6 °C (97.9 °F) (Temporal)   Resp 16   Ht 1.803 m (5' 11\")   Wt 88.5 kg (195 lb)   SpO2 97%     Physical Exam  Constitutional:       Appearance: Normal appearance. He is not ill-appearing or toxic-appearing.   HENT:      Head: Normocephalic.      Right Ear: External ear normal.      Left Ear: External ear normal.      Nose: Nose normal. No congestion or rhinorrhea.      Mouth/Throat:      Mouth: Mucous membranes are moist.      Pharynx: Oropharynx is clear. No oropharyngeal exudate or posterior oropharyngeal erythema.   Eyes:      General:         Right eye: No discharge.         Left eye: No discharge.      Extraocular Movements: Extraocular movements intact.      Conjunctiva/sclera: Conjunctivae normal.      Pupils: Pupils are equal, round, and reactive to light.   Cardiovascular:      Rate and Rhythm: Normal rate and regular rhythm.      Pulses: Normal pulses.      Heart sounds: Normal heart sounds. No murmur heard.  Pulmonary:      Effort: Pulmonary effort is normal. No respiratory distress.      Breath sounds: Normal breath sounds. No wheezing or rhonchi.   Abdominal:      General: Abdomen is flat.   Musculoskeletal:         General: No signs of injury. Normal range of motion.      Cervical back: Normal range of motion. No rigidity.   Lymphadenopathy:      Cervical: No cervical adenopathy.   Skin:     General: Skin is warm and dry.      Comments: I&D site on the left gluteal cleft without surrounding erythema or drainage.  " Appears to be healing well by secondary intention.  Minimal tenderness on palpation during exam.  Site is clean and dry.   Neurological:      General: No focal deficit present.      Mental Status: He is alert and oriented to person, place, and time.      Motor: No weakness.         Assessment/Plan:     Diagnosis and associated orders:     1. Encounter for recheck of abscess following incision and drainage     Comments/MDM:     Discussed with patient that wound is healing well by secondary intention and does not need to be repacked at this time.  Dressing with dry gauze and Tegaderm applied to the area.  Educated patient on keeping the area clean and dry as well as proper daily wound care and dressing.  Instructed patient to finish the prescribed antibiotic.  Discussed that he does not need to follow-up on Saturday, but to RTC if he experiences any symptoms of infection with the site or systemically, including but not limited to nausea, vomiting, diarrhea, dizziness, lightheadedness, fever and chills, increased tenderness/erythema/drainage from the site, or reaccumulation of pus or fluid under the skin.  Patient agreeable to this plan of care.  All questions answered.  Return to clinic if symptoms persist or worsen.  Red flag symptoms warranting emergency medical services discussed, including but not limited to chest pain, SOB, fever greater than 103F, dizziness, lightheadedness, abdominal pain, weakness, numbness or tingling.         Differential diagnosis, natural history, supportive care, and indications for immediate follow-up discussed.    Advised the patient to follow-up with the primary care physician for recheck, reevaluation, and consideration of further management.    Instructed patient to seek emergency medical attention via calling EMS or going to the Emergency Room for red flag symptoms, including but not limited to: chest pain, palpitations, fever greater than 103F, shortness of breath, wheezing, new or  worsened numbness/tingling, focal or unilateral weakness, and bloody vomit/stool.     Please note that this dictation was created using voice recognition software. I have made a reasonable attempt to correct obvious errors, but I expect that there are errors of grammar and possibly content that I did not discover before finalizing the note.    This note was electronically signed by MCKAY Light

## 2025-02-18 ENCOUNTER — APPOINTMENT (OUTPATIENT)
Dept: URBAN - METROPOLITAN AREA CLINIC 6 | Facility: CLINIC | Age: 30
Setting detail: DERMATOLOGY
End: 2025-02-18

## 2025-02-18 DIAGNOSIS — L82.1 OTHER SEBORRHEIC KERATOSIS: ICD-10-CM

## 2025-02-18 DIAGNOSIS — L81.0 POSTINFLAMMATORY HYPERPIGMENTATION: ICD-10-CM

## 2025-02-18 DIAGNOSIS — D18.0 HEMANGIOMA: ICD-10-CM

## 2025-02-18 DIAGNOSIS — Z71.89 OTHER SPECIFIED COUNSELING: ICD-10-CM

## 2025-02-18 DIAGNOSIS — L81.4 OTHER MELANIN HYPERPIGMENTATION: ICD-10-CM

## 2025-02-18 DIAGNOSIS — D22 MELANOCYTIC NEVI: ICD-10-CM

## 2025-02-18 DIAGNOSIS — Z86.14 PERSONAL HISTORY OF METHICILLIN RESISTANT STAPHYLOCOCCUS AUREUS INFECTION: ICD-10-CM

## 2025-02-18 PROBLEM — D18.01 HEMANGIOMA OF SKIN AND SUBCUTANEOUS TISSUE: Status: ACTIVE | Noted: 2025-02-18

## 2025-02-18 PROBLEM — D22.5 MELANOCYTIC NEVI OF TRUNK: Status: ACTIVE | Noted: 2025-02-18

## 2025-02-18 PROBLEM — D23.39 OTHER BENIGN NEOPLASM OF SKIN OF OTHER PARTS OF FACE: Status: ACTIVE | Noted: 2025-02-18

## 2025-02-18 PROCEDURE — 99213 OFFICE O/P EST LOW 20 MIN: CPT

## 2025-02-18 PROCEDURE — ? COUNSELING

## 2025-02-18 PROCEDURE — ? DIAGNOSIS COMMENT

## 2025-02-18 PROCEDURE — ? SUNSCREEN RECOMMENDATIONS

## 2025-02-18 ASSESSMENT — LOCATION SIMPLE DESCRIPTION DERM
LOCATION SIMPLE: RIGHT HAND
LOCATION SIMPLE: LEFT HAND
LOCATION SIMPLE: CHEST
LOCATION SIMPLE: LEFT THIGH
LOCATION SIMPLE: LEFT CHEEK
LOCATION SIMPLE: ABDOMEN

## 2025-02-18 ASSESSMENT — LOCATION ZONE DERM
LOCATION ZONE: LEG
LOCATION ZONE: TRUNK
LOCATION ZONE: HAND
LOCATION ZONE: FACE

## 2025-02-18 ASSESSMENT — LOCATION DETAILED DESCRIPTION DERM
LOCATION DETAILED: LEFT ULNAR DORSAL HAND
LOCATION DETAILED: STERNUM
LOCATION DETAILED: LEFT INFERIOR CENTRAL MALAR CHEEK
LOCATION DETAILED: LEFT ANTERIOR PROXIMAL THIGH
LOCATION DETAILED: PERIUMBILICAL SKIN
LOCATION DETAILED: RIGHT RADIAL DORSAL HAND
LOCATION DETAILED: LEFT MEDIAL INFERIOR CHEST
LOCATION DETAILED: EPIGASTRIC SKIN

## 2025-02-18 NOTE — PROCEDURE: DIAGNOSIS COMMENT
Render Risk Assessment In Note?: no
Comment: Advised to wash w/ hibicleanse intermittently.
Detail Level: Simple

## 2025-05-02 RX ORDER — ONDANSETRON 4 MG/1
4 TABLET, ORALLY DISINTEGRATING ORAL EVERY 6 HOURS PRN
Qty: 20 TABLET | Refills: 0 | Status: SHIPPED | OUTPATIENT
Start: 2025-05-02